# Patient Record
Sex: FEMALE | Race: WHITE | NOT HISPANIC OR LATINO | Employment: FULL TIME | URBAN - METROPOLITAN AREA
[De-identification: names, ages, dates, MRNs, and addresses within clinical notes are randomized per-mention and may not be internally consistent; named-entity substitution may affect disease eponyms.]

---

## 2024-03-18 ENCOUNTER — MEDICAL CORRESPONDENCE (OUTPATIENT)
Dept: HEALTH INFORMATION MANAGEMENT | Facility: CLINIC | Age: 34
End: 2024-03-18
Payer: COMMERCIAL

## 2024-03-20 ENCOUNTER — TRANSCRIBE ORDERS (OUTPATIENT)
Dept: OTHER | Age: 34
End: 2024-03-20

## 2024-03-20 DIAGNOSIS — M79.605 PAIN IN BOTH LOWER EXTREMITIES: ICD-10-CM

## 2024-03-20 DIAGNOSIS — M79.604 PAIN IN BOTH LOWER EXTREMITIES: ICD-10-CM

## 2024-03-20 DIAGNOSIS — E13.620: Primary | ICD-10-CM

## 2024-05-23 ENCOUNTER — LAB (OUTPATIENT)
Dept: LAB | Facility: CLINIC | Age: 34
End: 2024-05-23
Payer: COMMERCIAL

## 2024-05-23 ENCOUNTER — OFFICE VISIT (OUTPATIENT)
Dept: RHEUMATOLOGY | Facility: CLINIC | Age: 34
End: 2024-05-23
Attending: FAMILY MEDICINE
Payer: COMMERCIAL

## 2024-05-23 ENCOUNTER — HOSPITAL ENCOUNTER (OUTPATIENT)
Dept: GENERAL RADIOLOGY | Facility: HOSPITAL | Age: 34
Discharge: HOME | End: 2024-05-23
Attending: PHYSICIAN ASSISTANT
Payer: COMMERCIAL

## 2024-05-23 VITALS
DIASTOLIC BLOOD PRESSURE: 98 MMHG | SYSTOLIC BLOOD PRESSURE: 110 MMHG | WEIGHT: 204 LBS | HEART RATE: 110 BPM | OXYGEN SATURATION: 98 %

## 2024-05-23 DIAGNOSIS — R53.83 FATIGUE, UNSPECIFIED TYPE: ICD-10-CM

## 2024-05-23 DIAGNOSIS — M79.10 MYALGIA: ICD-10-CM

## 2024-05-23 DIAGNOSIS — M25.50 POLYARTHRALGIA: ICD-10-CM

## 2024-05-23 DIAGNOSIS — M25.50 POLYARTHRALGIA: Primary | ICD-10-CM

## 2024-05-23 LAB
CRP SERPL-MCNC: 17.4 MG/L
ERYTHROCYTE [SEDIMENTATION RATE] IN BLOOD BY WESTERGREN METHOD: 13 MM/HR (ref 0–20)

## 2024-05-23 PROCEDURE — 72200 X-RAY EXAM SI JOINTS: CPT

## 2024-05-23 PROCEDURE — 82085 ASSAY OF ALDOLASE: CPT | Mod: 90

## 2024-05-23 PROCEDURE — 86140 C-REACTIVE PROTEIN: CPT

## 2024-05-23 PROCEDURE — 73502 X-RAY EXAM HIP UNI 2-3 VIEWS: CPT

## 2024-05-23 PROCEDURE — 81374 HLA I TYPING 1 ANTIGEN LR: CPT

## 2024-05-23 PROCEDURE — 99204 OFFICE O/P NEW MOD 45 MIN: CPT | Performed by: PHYSICIAN ASSISTANT

## 2024-05-23 PROCEDURE — 99000 SPECIMEN HANDLING OFFICE-LAB: CPT

## 2024-05-23 PROCEDURE — 85652 RBC SED RATE AUTOMATED: CPT

## 2024-05-23 PROCEDURE — 36415 COLL VENOUS BLD VENIPUNCTURE: CPT

## 2024-05-23 RX ORDER — FLUTICASONE PROPIONATE 50 MCG
2 SPRAY, SUSPENSION (ML) NASAL
COMMUNITY

## 2024-05-23 RX ORDER — GLIPIZIDE 10 MG/1
10 TABLET ORAL
COMMUNITY
Start: 2023-09-07

## 2024-05-23 RX ORDER — TRAMADOL HYDROCHLORIDE 50 MG/1
50 TABLET ORAL
COMMUNITY
Start: 2024-03-04

## 2024-05-23 RX ORDER — ACETAMINOPHEN 500 MG
1000 TABLET ORAL
COMMUNITY

## 2024-05-23 RX ORDER — LEVONORGESTREL AND ETHINYL ESTRADIOL 0.15-0.03
1 KIT ORAL AT BEDTIME
COMMUNITY
Start: 2024-03-06

## 2024-05-23 RX ORDER — DAPAGLIFLOZIN 10 MG/1
1 TABLET, FILM COATED ORAL EVERY MORNING
COMMUNITY
Start: 2023-10-05

## 2024-05-23 RX ORDER — IBUPROFEN 200 MG
400 TABLET ORAL
COMMUNITY
Start: 2023-05-22

## 2024-05-23 RX ORDER — GABAPENTIN 300 MG/1
CAPSULE ORAL
COMMUNITY
Start: 2024-04-03

## 2024-05-23 RX ORDER — METHOCARBAMOL 500 MG/1
500-1000 TABLET, FILM COATED ORAL
COMMUNITY
Start: 2023-01-16

## 2024-05-23 NOTE — PATIENT INSTRUCTIONS
After Visit Instructions:     Thank you for coming to [facility] for your care. It is my goal to partner with you to help you reach your optimal state of health.       Plan:     Schedule follow-up with Kailey Jackson PA-C as needed  Imaging: xray SI joint  Labs: CRP, Sed Rate, Aldolase, HLA-B27      Kailey Jackson PA-C  [facility]  [facility]    Contact information: [facility]  Clinic Number:  [phone]  Please call or send a [employer] message with any questions about your care

## 2024-05-23 NOTE — PROGRESS NOTES
Rheumatology Clinic Visit  [facility]  CHARLES Jean Baptiste     Sofia Hzd MRN# [id number]   YOB: 1990 Age: 33 year old   Date of Visit: 05/23/2024  Primary care provider: [facility]          Assessment and Plan:     Polyarthralgia  Myalgia    Patient presents today for an initial evaluation of polyarthralgia and myalgia.  She was having numbness and pain in her arms and underwent a cervical discectomy for cervical spinal stenosis and cervical radiculopathy.  She states that the surgery did help some but she continues to have periodic pain in her arms.  It is not in any specific joints but more in the upper and lower arms and it tends to move around.  She is also been having a lot more pain in her left leg, knee and hip.  Physical examination today did not show any active synovitis or dactylitis.  Bilateral upper and lower extremity strength was 5 out of 5.  She did have tenderness in the left hip/buttock area when testing her lower extremity strength on the left.  Previous laboratory evaluations and imaging studies were reviewed, results below.    Reviewed the results of the patient's previous laboratory evaluation.  Antibody testing has returned normal/negative.  She did have a mildly elevated C-reactive protein.  Discussed with the patient that this is a nonspecific finding by itself.  At this time would recommend further lab evaluation.  Will recheck her inflammatory markers.  Will also get an x-ray of her SI joints as well as her left hip.  We did discuss the potential that this could be a diagnosis of fibromyalgia.  Discussed that this is a diagnosis of exclusion and we will do an autoimmune workup.  I will contact the patient with the results of the evaluation once it is complete.    Plan:     Schedule follow-up with Kailey Jackson PA-C as needed  Imaging: xray SI joint  Labs: CRP, Sed Rate, Aldolase, HLA-B27    CHARLES Jean Baptiste  Rheumatology         History of Present  Illness:   Sofia Hdz presents for evaluation of joint pain.  Past medical history includes insulin-dependent type 2 diabetes mellitus, obesity, pyoderma gangrenosum, history of cervical discectomy, cervical spinal stenosis, cervical radiculopathy, necrobiosis lipoidica diabeticorum.    She states that she has been having bilateral arm pain. It will move around to upper and lower arm. She had neck surgery for the nerves. This was May 2023. This was slightly helpful. This can come after doing activity, such as gardening. Then after she rests the pain starts to come. She states that it does not specifically feel like it is in a joint. She has had issues with her left leg. She states that no answer has been found. It started in the left lower leg and has moved into the knee and hip. She has swelling in her left leg that has been chronic from her previous wound. No other skin rashes. No dry eyes or dry mouth. No hair loss. No mouth sores. She states that she will get a cloudy water eye. It will then cause severe pain. Then it resolves after about 20 min. This started with the last few months. It happens once every couple weeks. She has not seen her eye provider for this. No history of pericarditis. No history of seizures or blood clots. No unexplained fevers or weight loss. No history of ulcerative colitis or crohn's. No personal history of psoriasis, but she did have a 3 weeks course of a pinky patchy scaly spot on her inner elbows. She does have fatigue. She has not noticed that foods, weather or mood affect her symptoms.          Review of Systems:     Constitutional: as above  Skin: as above  Eyes: negative  Ears/Nose/Throat: negative  Respiratory: No shortness of breath, dyspnea on exertion, cough, or hemoptysis  Cardiovascular: negative  Gastrointestinal: negative  Genitourinary: negative  Musculoskeletal: as above  Neurologic: negative  Psychiatric: negative  Hematologic/Lymphatic/Immunologic:  negative  Endocrine: negative         Active Problem List:   There are no problems to display for this patient.           Past Medical History:   No past medical history on file.  Past Surgical History:   Procedure Laterality Date    IR LUMBAR PUNCTURE  2/13/2023            Social History:     Social History     Socioeconomic History    Marital status: Patient Declined     Spouse name: Not on file    Number of children: Not on file    Years of education: Not on file    Highest education level: Not on file   Occupational History    Not on file   Tobacco Use    Smoking status: Not on file    Smokeless tobacco: Not on file   Substance and Sexual Activity    Alcohol use: Not on file    Drug use: Not on file    Sexual activity: Not on file   Other Topics Concern    Not on file   Social History Narrative    Not on file     Social Determinants of Health     Financial Resource Strain: Low Risk  (1/31/2024)    [facility]    Overall Financial Resource Strain (CARDIA)     Difficulty of Paying Living Expenses: Not hard at all   Food Insecurity: No Food Insecurity (1/31/2024)    [facility]    Hunger Vital Sign     Worried About Running Out of Food in the Last Year: Never true     Ran Out of Food in the Last Year: Never true   Transportation Needs: No Transportation Needs (1/31/2024)    Received from [facility]    Transportation Needs     In the past 12 months, has lack of transportation kept you from medical appointments, meetings, work, or from getting medicines or things needed for daily living?: No   Physical Activity: Insufficiently Active (1/31/2024)    [facility]    Exercise Vital Sign     Days of Exercise per Week: 1 day     Minutes of Exercise per Session: 60 min   Stress: No Stress Concern  Present (1/31/2024)    Received from [facility]    [employer] - Occupational Stress Questionnaire     Feeling of Stress : Not at all   Social Connections: Moderately Isolated (1/31/2024)    Received from [facility]    Social Connection and Isolation Panel [NHANES]     Frequency of Communication with Friends and Family: Twice a week     Frequency of Social Gatherings with Friends and Family: Once a week     Attends Alevism Services: Never     Active Member of Clubs or Organizations: No     Attends Club or Organization Meetings: Never     Marital Status:    Interpersonal Safety: Not At Risk (1/31/2024)    Received from [facility]    Intimate Partner Violence     Are you in a relationship where you are physically hurt, threatened and/or made to feel afraid?: No   Housing Stability: Unknown (1/31/2024)    Received from [facility]    Housing Stability Vital Sign     Unable to Pay for Housing in the Last Year: No     Number of Times Moved in the Last Year: Not on file     Homeless in the Last Year: Not on file          Family History:   No family history on file.         Allergies:     Allergies   Allergen Reactions    Metformin Diarrhea    Penicillins Rash     In childhood            Medications:     Current Outpatient Medications   Medication Sig Dispense Refill    acetaminophen (TYLENOL) 500 MG tablet Take 1,000 mg by mouth      dapagliflozin (FARXIGA) 10 MG TABS tablet Take 1 tablet by mouth every morning      fluticasone (FLONASE) 50 MCG/ACT nasal spray Spray 2 sprays in nostril      gabapentin (NEURONTIN) 300 MG capsule TAKE 2 CAPSULES BY MOUTH IN THE MORNING AND 1 CAPSULE IN THE EVENING      glipiZIDE (GLUCOTROL) 10 MG tablet Take 10 mg by mouth      ibuprofen (ADVIL/MOTRIN) 200 MG tablet Take 400 mg by mouth      insulin  degludec (TRESIBA) 200 UNIT/ML pen Inject 90 Units Subcutaneous      levonorgestrel-ethinyl estradiol (NORDETTE) 0.15-30 MG-MCG tablet Take 1 tablet by mouth at bedtime      methocarbamol (ROBAXIN) 500 MG tablet Take 500-1,000 mg by mouth      Semaglutide (RYBELSUS) 3 MG tablet Take 3 mg by mouth      [START ON 6/21/2024] Semaglutide (RYBELSUS) 7 MG tablet Take 7 mg by mouth      traMADol (ULTRAM) 50 MG tablet Take 50 mg by mouth              Physical Exam:   Blood pressure (!) 110/98, pulse 110, weight 92.5 kg (204 lb), SpO2 98%.  Wt Readings from Last 6 Encounters:   05/23/24 92.5 kg (204 lb)     Constitutional: well-developed, appearing stated age; cooperative  Eyes: nl PERRLA, conjunctiva, sclera  ENT: nl external ears, nose, hearing, lips, teeth, gums, throat. No mucositis.   No mucous membrane lesions, normal saliva pool  Neck: no mass or thyroid enlargement  Resp: no shortness of breath with normal conversation  Lymph: no cervical, supraclavicular or epitrochlear nodes  MS: The TMJ, neck, shoulder, elbow, wrist, MCP/PIP/DIP, spine, hip, knee, ankle, and foot MTP/IP joints were examined and found normal. No active synovitis or altered joint anatomy. Full joint ROM. Normal  strength. No dactylitis,  tenosynovitis, enthesopathy. Bilateral upper and lower extremity strength is 5/5. She did have pain in the left hip/buttock area with strength testing   Skin: no nail pitting, alopecia. Dark red [place] to right lower leg, no ulceration.  Psych: nl judgement, orientation, memory, affect.           Data:   Imaging:  CT cervical spine 10/26/2023  IMPRESSION:   1. Stable disc arthroplasty at C6-C7 without complication.   2. Unchanged spondylosis at C4-C5, C5-C6 and C6-C7.   3. Uncomplicated C4 partial laminectomies.     Laboratory:  10/6/2023  JULIANNE negative  CCP antibody negative  No monoclonal protein noted  Tickborne illnesses negative  C-reactive protein 2.11  Sed rate 11  Creatinine 0.81, GFR greater than  60  Albumin 4.2  AST 33, ALT 30    1/31/2024  Hemoglobin A1c 8.2  CK 56

## 2024-05-24 LAB — ALDOLASE SERPL-CCNC: 4.1 U/L

## 2024-05-29 LAB
B LOCUS: NORMAL
B27TEST METHOD: NORMAL

## 2024-06-20 ENCOUNTER — MYC MEDICAL ADVICE (OUTPATIENT)
Dept: RHEUMATOLOGY | Facility: CLINIC | Age: 34
End: 2024-06-20
Payer: COMMERCIAL

## 2024-06-26 NOTE — TELEPHONE ENCOUNTER
Action    Action Taken records request sent to [facility] for CT cervical completed October 2023      SPINE PATIENTS - NEW PROTOCOL PREVISIT    RECORDS RECEIVED FROM: Referred by Kailey Jackson PA-C for 2nd opinion   REASON FOR VISIT: Neck pain    PROVIDER: Anshul   DATE OF APPT: 07/23/2024   NOTES (FOR ALL VISITS) STATUS DETAILS   OFFICE NOTE from referring provider Care Everywhere Referral an notes in chart   OFFICE NOTE from other specialist Care Everywhere PT last completed 02/15/2023 w/[facility]   DISCHARGE SUMMARY from hospital N/A    DISCHARGE REPORT from ER N/A    OPERATIVE REPORT Care Everywhere 2 prior cervical spine surgery [facility] 2014 C6-C7 disc replacement 2014 and 2023   EMG REPORT N/A    MEDICATION LIST N/A    IMAGING  (FOR ALL VISITS)     MRI (HEAD, NECK, SPINE) N/A    XRAY (SPINE) *NEUROSURGERY* N/A    CT (HEAD, NECK, SPINE) received CT cervical 10/26/2023

## 2024-07-20 ENCOUNTER — HEALTH MAINTENANCE LETTER (OUTPATIENT)
Age: 34
End: 2024-07-20

## 2024-07-23 ENCOUNTER — PRE VISIT (OUTPATIENT)
Dept: NEUROSURGERY | Facility: CLINIC | Age: 34
End: 2024-07-23

## 2024-07-23 ENCOUNTER — HOSPITAL ENCOUNTER (OUTPATIENT)
Dept: GENERAL RADIOLOGY | Facility: HOSPITAL | Age: 34
Discharge: HOME | End: 2024-07-23
Attending: PHYSICIAN ASSISTANT | Admitting: PHYSICIAN ASSISTANT
Payer: COMMERCIAL

## 2024-07-23 ENCOUNTER — OFFICE VISIT (OUTPATIENT)
Dept: NEUROSURGERY | Facility: CLINIC | Age: 34
End: 2024-07-23
Attending: PHYSICIAN ASSISTANT
Payer: COMMERCIAL

## 2024-07-23 VITALS — DIASTOLIC BLOOD PRESSURE: 75 MMHG | HEART RATE: 91 BPM | SYSTOLIC BLOOD PRESSURE: 135 MMHG | OXYGEN SATURATION: 99 %

## 2024-07-23 DIAGNOSIS — M54.12 CERVICAL RADICULOPATHY: Primary | ICD-10-CM

## 2024-07-23 DIAGNOSIS — M54.2 NECK PAIN: ICD-10-CM

## 2024-07-23 DIAGNOSIS — M54.12 CERVICAL RADICULOPATHY: ICD-10-CM

## 2024-07-23 PROCEDURE — 72050 X-RAY EXAM NECK SPINE 4/5VWS: CPT

## 2024-07-23 PROCEDURE — 99213 OFFICE O/P EST LOW 20 MIN: CPT | Performed by: PHYSICIAN ASSISTANT

## 2024-07-23 ASSESSMENT — PAIN SCALES - GENERAL: PAINLEVEL: NO PAIN (1)

## 2024-07-23 NOTE — PROGRESS NOTES
NEUROSURGERY CLINIC CONSULT NOTE     DATE OF VISIT: 7/23/2024     SUBJECTIVE:     Sofia Hdz is a pleasant 33 year old female who presents to the clinic today for consultation on second opinion cervical. She is referred to the Neurosurgery Clinic by Kailey Jackson PA-C in Rheumatology. Pertinent medical history consists of prior cervical surgeries [facility] with Dr. Tong- C6-7 arthroplasty, C4 partial laminectomies with last surgery in May 2023.     Patient states symptoms started prior to her first surgery in 2016. She had left sided radicular symptoms that completed resolved after 2016 surgery. She started having new radicular symptoms in BL arms in 2020. She notes pain is intermittent, starts in her neck and radiates in her arms with random distribution in her arms and all of her fingers. Denies one arm being worse than the other and denies certain fingers more affected than others. Denies gait changes, weakness, bowel/bladder changes, changes hand dexterity. She has tried NSAIDs, tylenol, light activity. Prior to 2023 surgery she tried cervical injections and PO steroids. She has not participated in PT since prior to her 2023 surgery. Had EMG in the past, cannot recall when.         Current Outpatient Medications:     acetaminophen (TYLENOL) 500 MG tablet, Take 1,000 mg by mouth, Disp: , Rfl:     dapagliflozin (FARXIGA) 10 MG TABS tablet, Take 1 tablet by mouth every morning, Disp: , Rfl:     fluticasone (FLONASE) 50 MCG/ACT nasal spray, Spray 2 sprays in nostril, Disp: , Rfl:     gabapentin (NEURONTIN) 300 MG capsule, TAKE 2 CAPSULES BY MOUTH IN THE MORNING AND 1 CAPSULE IN THE EVENING, Disp: , Rfl:     glipiZIDE (GLUCOTROL) 10 MG tablet, Take 10 mg by mouth, Disp: , Rfl:     ibuprofen (ADVIL/MOTRIN) 200 MG tablet, Take 400 mg by mouth, Disp: , Rfl:     insulin degludec (TRESIBA) 200 UNIT/ML pen, Inject 90 Units Subcutaneous, Disp: , Rfl:     levonorgestrel-ethinyl estradiol (NORDETTE)  0.15-30 MG-MCG tablet, Take 1 tablet by mouth at bedtime, Disp: , Rfl:     methocarbamol (ROBAXIN) 500 MG tablet, Take 500-1,000 mg by mouth, Disp: , Rfl:     Semaglutide (RYBELSUS) 7 MG tablet, Take 7 mg by mouth, Disp: , Rfl:     traMADol (ULTRAM) 50 MG tablet, Take 50 mg by mouth, Disp: , Rfl:      Allergies   Allergen Reactions    Metformin Diarrhea    Penicillins Rash     In childhood        No past medical history on file.     ROS: 10 point review of symptoms are negative other than the symptoms noted above in the HPI.     Family History has been reviewed with the patient, there are no pertinent findings to presenting concern.     Past Surgical History:   Procedure Laterality Date    IR LUMBAR PUNCTURE  2/13/2023              OBJECTIVE:   /75   Pulse 91   SpO2 99%    There is no height or weight on file to calculate BMI.     Imaging:     EXAM: XR CERVICAL SPINE G/E 4 VIEWS  LOCATION: [facility]  DATE: 7/23/2024     INDICATION: Prior arthroplasty; assess dynamic instability.  COMPARISON: Cervical spine CT 10/23/2023.                                                                      IMPRESSION: Intervertebral disc spacer at the C6-C7 level. Hardware appears intact on these images. Cervical spine alignment is within normal limits without significant change in alignment on flexion or extension views. No obvious loss of vertebral body   height. Moderate degenerative endplate changes and loss of disc height at C4-C5 and to a lesser extent C5-C6.    CT 2023 reviewed.    Full radiological report in chart. Imaging was reviewed with with patient today.     Exam:   CN II-XII grossly intact, alert and appropriate with conversation and following commands.   Gait is non-antalgic. Able to tandem walk. Able to walk on toes and heels without difficulty.   Cervical spine is non tender to palpation. Appropriate range of motion of neck, not concerning for lhermitte's phenomenon. Negative  spurlings BL  Bilateral bicep 2/4 and tricep reflexes 1/4. Sensation intact throughout upper extremities.     UE muscle strength  Right  Left    Deltoid  5/5  5/5    Biceps  5/5  5/5    Triceps  5/5  5/5    Hand intrinsics  5/5  5/5    Hand grasp  5/5  5/5    Zamarripa signs  neg  neg      Intact sensation throughout lower extremities.   Patellar 2+/4 left, 2/4 right and achilles reflex 1/4.     LE muscle strength  Right  Left    Iliopsoas (hip flexion)  5/5  5/5    Quad (knee extension)  5/5  5/5    Hamstring (knee flexion)  5/5  5/5    Gastrocnemius (PF)  5/5  5/5    Tibialis Ant. (DF)  5/5  5/5    EHL  5/5  5/5    Negative for clonus.   Calves are soft and non-tender bilaterally.     ASSESSMENT/PLAN:     Sofia Hdz is a pleasant 33 year old female who presents to the clinic today for consultation on second opinion cervical. She is referred to the Neurosurgery Clinic by Kailey Jackson PA-C in Rheumatology. Pertinent medical history consists of prior cervical surgeries [facility] with Dr. Tong- C6-7 arthroplasty, C4 partial laminectomies with last surgery in May 2023.     Patient states symptoms started prior to her first surgery in 2016. She had left sided radicular symptoms that completed resolved after 2016 surgery. She started having new radicular symptoms in BL arms in 2020. She notes pain is intermittent, starts in her neck and radiates in her arms with random distribution in her arms and all of her fingers. Denies one arm being worse than the other and denies certain fingers more affected than others. Denies gait changes, weakness, bowel/bladder changes, changes hand dexterity. She has tried NSAIDs, tylenol, light activity. Prior to 2023 surgery she tried cervical injections and PO steroids. She has not participated in PT since prior to her 2023 surgery. Had EMG in the past, cannot recall when.     PLAN:   -cervical XR today, will update you on results . Addendum- updated patient on results  7/24    -physical therapy referral to be faxed closer to home   -cervical MRI has been ordered- please ensure you participate in a few sessions of PT prior so there are no insurance issues   -continue conservative measures as you have been doing    Will update you on MRI results after we receive them within 5-7 days. Dr. Camarillo may recommend a EMG in the future     Call [facility] radiology scheduling for your procedure:  For scheduling in the [place] ([place], [facility], and [place]) call [phone] or [phone]      For [facility] ([facility], [facility], [place]), call [phone] or [phone]      For scheduling in the [place] ([facility]) call [phone]  or  [phone]      Waleska Nguyen PA-C  [facility]  [address] [place] [id number]  Tel [phone]  Fax [phone]  Text page via [facility] Paging/Directory

## 2024-07-23 NOTE — Clinical Note
Hi team! Can we fax PT order to [facility]equ[facility], phone # is [phone] Thank you!

## 2024-07-23 NOTE — LETTER
7/23/2024      Sofia Hdz  [address] [facility] [address]      Dear Colleague,    Thank you for referring your patient, Sofia Hdz, to the [facility] SPINE AND NEUROSURGERY. Please see a copy of my visit note below.    NEUROSURGERY CLINIC CONSULT NOTE     DATE OF VISIT: 7/23/2024     SUBJECTIVE:     Sofia Hdz is a pleasant 33 year old female who presents to the clinic today for consultation on second opinion cervical. She is referred to the Neurosurgery Clinic by Kailey Jackson PA-C in Rheumatology. Pertinent medical history consists of prior cervical surgeries [facility] with Dr. Tong- C6-7 arthroplasty, C4 partial laminectomies with last surgery in May 2023.     Patient states symptoms started prior to her first surgery in 2016. She had left sided radicular symptoms that completed resolved after 2016 surgery. She started having new radicular symptoms in BL arms in 2020. She notes pain is intermittent, starts in her neck and radiates in her arms with random distribution in her arms and all of her fingers. Denies one arm being worse than the other and denies certain fingers more affected than others. Denies gait changes, weakness, bowel/bladder changes, changes hand dexterity. She has tried NSAIDs, tylenol, light activity. Prior to 2023 surgery she tried cervical injections and PO steroids. She has not participated in PT since prior to her 2023 surgery. Had EMG in the past, cannot recall when.         Current Outpatient Medications:      acetaminophen (TYLENOL) 500 MG tablet, Take 1,000 mg by mouth, Disp: , Rfl:      dapagliflozin (FARXIGA) 10 MG TABS tablet, Take 1 tablet by mouth every morning, Disp: , Rfl:      fluticasone (FLONASE) 50 MCG/ACT nasal spray, Spray 2 sprays in nostril, Disp: , Rfl:      gabapentin (NEURONTIN) 300 MG capsule, TAKE 2 CAPSULES BY MOUTH IN THE MORNING AND 1 CAPSULE IN THE EVENING, Disp: , Rfl:      glipiZIDE (GLUCOTROL) 10 MG tablet,  Take 10 mg by mouth, Disp: , Rfl:      ibuprofen (ADVIL/MOTRIN) 200 MG tablet, Take 400 mg by mouth, Disp: , Rfl:      insulin degludec (TRESIBA) 200 UNIT/ML pen, Inject 90 Units Subcutaneous, Disp: , Rfl:      levonorgestrel-ethinyl estradiol (NORDETTE) 0.15-30 MG-MCG tablet, Take 1 tablet by mouth at bedtime, Disp: , Rfl:      methocarbamol (ROBAXIN) 500 MG tablet, Take 500-1,000 mg by mouth, Disp: , Rfl:      Semaglutide (RYBELSUS) 7 MG tablet, Take 7 mg by mouth, Disp: , Rfl:      traMADol (ULTRAM) 50 MG tablet, Take 50 mg by mouth, Disp: , Rfl:      Allergies   Allergen Reactions     Metformin Diarrhea     Penicillins Rash     In childhood        No past medical history on file.     ROS: 10 point review of symptoms are negative other than the symptoms noted above in the HPI.     Family History has been reviewed with the patient, there are no pertinent findings to presenting concern.     Past Surgical History:   Procedure Laterality Date     IR LUMBAR PUNCTURE  2/13/2023              OBJECTIVE:   /75   Pulse 91   SpO2 99%    There is no height or weight on file to calculate BMI.     Imaging:     CT 2023 reviewed.    Full radiological report in chart. Imaging was reviewed with with patient today.     Exam:   CN II-XII grossly intact, alert and appropriate with conversation and following commands.   Gait is non-antalgic. Able to tandem walk. Able to walk on toes and heels without difficulty.   Cervical spine is non tender to palpation. Appropriate range of motion of neck, not concerning for lhermitte's phenomenon. Negative spurlings BL  Bilateral bicep 2/4 and tricep reflexes 1/4. Sensation intact throughout upper extremities.     UE muscle strength  Right  Left    Deltoid  5/5  5/5    Biceps  5/5  5/5    Triceps  5/5  5/5    Hand intrinsics  5/5  5/5    Hand grasp  5/5  5/5    Zamarripa signs  neg  neg      Intact sensation throughout lower extremities.   Patellar 2+/4 left, 2/4 right and achilles reflex  1/4.     LE muscle strength  Right  Left    Iliopsoas (hip flexion)  5/5  5/5    Quad (knee extension)  5/5  5/5    Hamstring (knee flexion)  5/5  5/5    Gastrocnemius (PF)  5/5  5/5    Tibialis Ant. (DF)  5/5  5/5    EHL  5/5  5/5    Negative for clonus.   Calves are soft and non-tender bilaterally.     ASSESSMENT/PLAN:     Sofia Hdz is a pleasant 33 year old female who presents to the clinic today for consultation on second opinion cervical. She is referred to the Neurosurgery Clinic by Kailey Jackson PA-C in Rheumatology. Pertinent medical history consists of prior cervical surgeries [facility] with Dr. Tong- C6-7 arthroplasty, C4 partial laminectomies with last surgery in May 2023.     Patient states symptoms started prior to her first surgery in 2016. She had left sided radicular symptoms that completed resolved after 2016 surgery. She started having new radicular symptoms in BL arms in 2020. She notes pain is intermittent, starts in her neck and radiates in her arms with random distribution in her arms and all of her fingers. Denies one arm being worse than the other and denies certain fingers more affected than others. Denies gait changes, weakness, bowel/bladder changes, changes hand dexterity. She has tried NSAIDs, tylenol, light activity. Prior to 2023 surgery she tried cervical injections and PO steroids. She has not participated in PT since prior to her 2023 surgery. Had EMG in the past, cannot recall when.     PLAN:   -cervical XR today, will update you on results   -physical therapy referral to be faxed closer to home   -cervical MRI has been ordered- please ensure you participate in a few sessions of PT prior so there are no insurance issues   -continue conservative measures as you have been doing    Will update you on MRI results after we receive them within 5-7 days. Dr. Camarillo may recommend a EMG in the future     Call [place] radiology scheduling for your procedure:  For  scheduling in the [place] ([place], [facility], and [place]) call [phone] or [phone]      For scheduling at [facility] ([facility], [facility], [place]), call [phone] or [phone]      For scheduling in the [place] ([facility]) call [phone]  or  [phone]      Waleska Nguyen PA-C  [facility] Neurosurgery  [address] [place] [address]  Tel [phone]  Fax [phone]  Text page via [facility] Paging/Directory    Again, thank you for allowing me to participate in the care of your patient.        Sincerely,        Waleska Landers PA-C

## 2024-07-23 NOTE — PATIENT INSTRUCTIONS
-cervical XR today, will update you on results   -physical therapy referral to be faxed closer to home   -cervical MRI has been ordered- please ensure you participate in a few sessions of PT prior so there are no insurance issues   -continue conservative measures as you have been doing    Will update you on MRI results after we receive them within 5-7 days. Dr. Camarillo may recommend a EMG in the future     Call [place] radiology scheduling for your procedure:  For scheduling in the [place] ([place], [facility], and [place]) call [phone] or [phone]      For scheduling at [facility] ([facility], [facility], [place]), call [phone] or [phone]      For scheduling in the [place] ([facility]) call [phone]  or  [phone]      Waleska Nguyen PA-C  [facility] Neurosurgery  [address]  [address] [place] [id number]  Tel [phone]  Fax [phone]  Text page via [facility] Paging/Directory

## 2024-07-31 ENCOUNTER — MYC MEDICAL ADVICE (OUTPATIENT)
Dept: RHEUMATOLOGY | Facility: CLINIC | Age: 34
End: 2024-07-31
Payer: COMMERCIAL

## 2024-09-23 ENCOUNTER — HOSPITAL ENCOUNTER (OUTPATIENT)
Dept: MRI IMAGING | Facility: CLINIC | Age: 34
Discharge: HOME | End: 2024-09-23
Attending: PHYSICIAN ASSISTANT | Admitting: PHYSICIAN ASSISTANT
Payer: COMMERCIAL

## 2024-09-23 DIAGNOSIS — M54.12 CERVICAL RADICULOPATHY: ICD-10-CM

## 2024-09-23 PROCEDURE — 72156 MRI NECK SPINE W/O & W/DYE: CPT

## 2024-09-23 PROCEDURE — 255N000002 HC RX 255 OP 636: Performed by: PHYSICIAN ASSISTANT

## 2024-09-23 PROCEDURE — A9585 GADOBUTROL INJECTION: HCPCS | Performed by: PHYSICIAN ASSISTANT

## 2024-09-23 RX ORDER — GADOBUTROL 604.72 MG/ML
10 INJECTION INTRAVENOUS ONCE
Status: COMPLETED | OUTPATIENT
Start: 2024-09-23 | End: 2024-09-23

## 2024-09-23 RX ADMIN — GADOBUTROL 9 ML: 604.72 INJECTION INTRAVENOUS at 12:22

## 2024-09-24 ENCOUNTER — TELEPHONE (OUTPATIENT)
Dept: NEUROSURGERY | Facility: CLINIC | Age: 34
End: 2024-09-24
Payer: COMMERCIAL

## 2024-09-24 DIAGNOSIS — M54.2 NECK PAIN: Primary | ICD-10-CM

## 2024-09-24 DIAGNOSIS — G93.9 BRAIN LESION: ICD-10-CM

## 2024-09-24 DIAGNOSIS — M54.12 CERVICAL RADICULOPATHY: ICD-10-CM

## 2024-09-24 NOTE — TELEPHONE ENCOUNTER
Reviewed MRI with patient      Recommend   EMG BUE  Brain MRI pituitary protocol     Will set up with Dr. Ybarra in clinic following pituitary MRI     Patient in agreement     Waleska Nguyen PA-C  [facility] Neurosurgery  [address] [address]  Tel [phone]  Fax [phone]  Text page via [facility] Paging/Directory

## 2024-09-25 ENCOUNTER — HOSPITAL ENCOUNTER (OUTPATIENT)
Dept: MRI IMAGING | Facility: CLINIC | Age: 34
Discharge: HOME | End: 2024-09-25
Attending: PHYSICIAN ASSISTANT | Admitting: PHYSICIAN ASSISTANT
Payer: COMMERCIAL

## 2024-09-25 ENCOUNTER — TELEPHONE (OUTPATIENT)
Dept: NEUROSURGERY | Facility: CLINIC | Age: 34
End: 2024-09-25
Payer: COMMERCIAL

## 2024-09-25 DIAGNOSIS — G93.9 BRAIN LESION: ICD-10-CM

## 2024-09-25 DIAGNOSIS — G93.9 BRAIN LESION: Primary | ICD-10-CM

## 2024-09-25 DIAGNOSIS — M54.12 CERVICAL RADICULOPATHY: Primary | ICD-10-CM

## 2024-09-25 PROCEDURE — 255N000002 HC RX 255 OP 636: Performed by: PHYSICIAN ASSISTANT

## 2024-09-25 PROCEDURE — A9585 GADOBUTROL INJECTION: HCPCS | Performed by: PHYSICIAN ASSISTANT

## 2024-09-25 PROCEDURE — 70553 MRI BRAIN STEM W/O & W/DYE: CPT

## 2024-09-25 RX ORDER — GADOBUTROL 604.72 MG/ML
9 INJECTION INTRAVENOUS ONCE
Status: COMPLETED | OUTPATIENT
Start: 2024-09-25 | End: 2024-09-25

## 2024-09-25 RX ADMIN — GADOBUTROL 9 ML: 604.72 INJECTION INTRAVENOUS at 20:35

## 2024-09-25 NOTE — TELEPHONE ENCOUNTER
Who's calling:   Patient             Family/Other name:      On C2C: yes or No: N/A       Providers name/other:    DOMINGO  Reason for call:  PATIENT INFORMATION & QUESTIONS    Detailed Message: Patient scheduled for an MRI ordered by Waleska Landers. She would like this to be done as soon as possible, was wondering if provider can change this order to be a STAT order to be seen sooner.     Please advise, thank you.      Call back #: [phone]  Preferred Pharmacy:

## 2024-09-26 ENCOUNTER — TELEPHONE (OUTPATIENT)
Dept: NEUROSURGERY | Facility: CLINIC | Age: 34
End: 2024-09-26
Payer: COMMERCIAL

## 2024-09-26 ENCOUNTER — LAB (OUTPATIENT)
Dept: LAB | Facility: CLINIC | Age: 34
End: 2024-09-26
Payer: COMMERCIAL

## 2024-09-26 ENCOUNTER — MYC MEDICAL ADVICE (OUTPATIENT)
Dept: NEUROSURGERY | Facility: CLINIC | Age: 34
End: 2024-09-26
Payer: COMMERCIAL

## 2024-09-26 ENCOUNTER — DOCUMENTATION ONLY (OUTPATIENT)
Dept: NEUROSURGERY | Facility: CLINIC | Age: 34
End: 2024-09-26
Payer: COMMERCIAL

## 2024-09-26 DIAGNOSIS — G93.9 BRAIN LESION: ICD-10-CM

## 2024-09-26 LAB
T4 FREE SERPL-MCNC: 1.28 NG/DL (ref 0.9–1.7)
TSH SERPL DL<=0.005 MIU/L-ACNC: 3.43 UIU/ML (ref 0.3–4.2)

## 2024-09-26 PROCEDURE — 84481 FREE ASSAY (FT-3): CPT

## 2024-09-26 PROCEDURE — 82533 TOTAL CORTISOL: CPT

## 2024-09-26 PROCEDURE — 83001 ASSAY OF GONADOTROPIN (FSH): CPT

## 2024-09-26 PROCEDURE — 83002 ASSAY OF GONADOTROPIN (LH): CPT

## 2024-09-26 PROCEDURE — 84146 ASSAY OF PROLACTIN: CPT

## 2024-09-26 PROCEDURE — 84439 ASSAY OF FREE THYROXINE: CPT

## 2024-09-26 PROCEDURE — 84443 ASSAY THYROID STIM HORMONE: CPT

## 2024-09-26 PROCEDURE — 82024 ASSAY OF ACTH: CPT

## 2024-09-26 PROCEDURE — 84305 ASSAY OF SOMATOMEDIN: CPT

## 2024-09-26 PROCEDURE — 84144 ASSAY OF PROGESTERONE: CPT

## 2024-09-26 PROCEDURE — 36415 COLL VENOUS BLD VENIPUNCTURE: CPT

## 2024-09-26 NOTE — TELEPHONE ENCOUNTER
Faxed  Lab requisitions with diagnosis and ordering Provider's signature  September 26, 2024 to fax number [phone]    Right Fax confirmed at 144 PM    Quinn Sam RN

## 2024-09-26 NOTE — TELEPHONE ENCOUNTER
[facility] Call Center    Phone Message    May a detailed message be left on voicemail: yes     Reason for Call: Other: Patient is returning call to schedule. Writer is unable to assist in scheduling without scheduling instructions. Please review and call back.      Action Taken: Message routed to:  Other: [facility] Neurosurgery    Travel Screening: Not Applicable

## 2024-09-26 NOTE — TELEPHONE ENCOUNTER
[facility] Call Center    Phone Message    May a detailed message be left on voicemail: yes     Reason for Call: Luma from [facility] called.  She received lab orders from Waleska Landers but the orders do not include an electronic signature from the provider and they do no have a diagnosis on any of the pages.  It needs to include a diagnosis.  She is requesting that this be updated and resent to them.  Please resend.  Call if you have any questions.  Thanks.

## 2024-09-26 NOTE — TELEPHONE ENCOUNTER
Per staff message request, patient needs appointment with Dr. Ybarra for a surgical consult. Writer WILLIAMS for patient to call back at [phone] to schedule when she is able.

## 2024-09-26 NOTE — TELEPHONE ENCOUNTER
Faxed  lab orders  September 26, 2024 to fax number [phone]    Right Fax confirmed at 1036 AM    Quinn Sam RN

## 2024-09-27 LAB
ACTH PLAS-MCNC: 18 PG/ML
CORTIS SERPL-MCNC: 10.2 UG/DL
FSH SERPL IRP2-ACNC: 0.7 MIU/ML
IGF-I BLD-MCNC: 200 NG/ML (ref 83–280)
LH SERPL-ACNC: <0.3 MIU/ML
PROGEST SERPL-MCNC: 0.1 NG/ML
PROLACTIN SERPL 3RD IS-MCNC: 163 NG/ML (ref 5–23)
T3FREE SERPL-MCNC: 3.6 PG/ML (ref 2–4.4)

## 2024-09-30 ENCOUNTER — TELEPHONE (OUTPATIENT)
Dept: OPHTHALMOLOGY | Facility: CLINIC | Age: 34
End: 2024-09-30

## 2024-09-30 ENCOUNTER — OFFICE VISIT (OUTPATIENT)
Dept: NEUROSURGERY | Facility: CLINIC | Age: 34
End: 2024-09-30
Payer: COMMERCIAL

## 2024-09-30 VITALS
DIASTOLIC BLOOD PRESSURE: 76 MMHG | SYSTOLIC BLOOD PRESSURE: 114 MMHG | WEIGHT: 210.7 LBS | OXYGEN SATURATION: 98 % | HEART RATE: 78 BPM

## 2024-09-30 DIAGNOSIS — G93.9 BRAIN LESION: Primary | ICD-10-CM

## 2024-09-30 PROCEDURE — 99245 OFF/OP CONSLTJ NEW/EST HI 55: CPT | Performed by: NEUROLOGICAL SURGERY

## 2024-09-30 ASSESSMENT — PAIN SCALES - GENERAL: PAINLEVEL: MILD PAIN (2)

## 2024-09-30 NOTE — TELEPHONE ENCOUNTER
Called and LVM     Sent a [email or web address] message     Ok to schedule with Dr. Garcia or Dr. Hernandez in a new neuro - add to the wait list     Mechelle Jacob Communication Facilitator on 9/30/2024 at 5:22 PM

## 2024-09-30 NOTE — LETTER
9/30/2024      Sofia Hdz  [address] [facility] [address]      Dear Colleague,    Thank you for referring your patient, Sofia Hdz, to the [facility] NEUROLOGY CLINICS [facility]. Please see a copy of my visit note below.    I was asked by Dr. Murphy to see this patient in consultation    33 year old female with sellar mass.  Was discovered on MRI Cervical for evaluation of neck pain.  Patient describes a couple months of other symptoms, including right eyelid twitching, some non-specific right eye vision change, and dull burning pain in the right forehead and maxilla, with throbbing frontal and occipital headaches as well.  She notes that she used to have very irregular menstrual cycles prior to starting birth control a few years ago.  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       No past medical history on file.  Past Surgical History:   Procedure Laterality Date     IR LUMBAR PUNCTURE  2/13/2023         Physical Exam  /76   Pulse 78   Wt 95.6 kg (210 lb 11.2 oz)   SpO2 98%     Mental status:  Alert and Oriented x 3, speech is fluent.  HEENT:  PERRL.  EOM s intact.  Visual fields full to gross exam  Cranial nerves:  II-XII intact.   Motor:    Shoulder Abduction:  Right:  5/5   Left:  5/5  Biceps:                      Right:  5/5   Left:  5/5  Triceps:                     Right:  5/5   Left:  5/5  Interosseus :             Right:  5/5   Left:  5/5  Hip Flexion:               Right: 5/5  Left:  5/5  Quadriceps:              Right:  5/5  Left:  5/5  Hamstrings:              Right:  5/5  Left:  5/5  Gastroc Soleus:        Right:  5/5  Left:  5/5  Tib/Ant:                      Right:  5/5  Left:  5/5  EHL:                          Right:  5/5  Left:  5/5  Sensation:  Intact  Reflexes:  Negative  Irina.  Smooth tandem walking.      A/P:  33 year old female with sellar mass    I had a discussion with the patient, reviewing the history, symptoms, and imaging  Will order Neuro-Optho eval  Endocrine eval with Dr. Cortez to review possibility of lymphocytic hypophysitis and whether their are medical treatment options  Follow up after work-up complete          Again, thank you for allowing me to participate in the care of your patient.        Sincerely,        Oscar Ybarra MD

## 2024-09-30 NOTE — PROGRESS NOTES
I was asked by Dr. Murphy to see this patient in consultation    33 year old female with sellar mass.  Was discovered on MRI Cervical for evaluation of neck pain.  Patient describes a couple months of other symptoms, including right eyelid twitching, some non-specific right eye vision change, and dull burning pain in the right forehead and maxilla, with throbbing frontal and occipital headaches as well.  She notes that she used to have very irregular menstrual cycles prior to starting birth control a few years ago.  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       No past medical history on file.  Past Surgical History:   Procedure Laterality Date    IR LUMBAR PUNCTURE  2/13/2023         Physical Exam  /76   Pulse 78   Wt 95.6 kg (210 lb 11.2 oz)   SpO2 98%     Mental status:  Alert and Oriented x 3, speech is fluent.  HEENT:  PERRL.  EOM s intact.  Visual fields full to gross exam  Cranial nerves:  II-XII intact.   Motor:    Shoulder Abduction:  Right:  5/5   Left:  5/5  Biceps:                      Right:  5/5   Left:  5/5  Triceps:                     Right:  5/5   Left:  5/5  Interosseus :             Right:  5/5   Left:  5/5  Hip Flexion:               Right: 5/5  Left:  5/5  Quadriceps:              Right:  5/5  Left:  5/5  Hamstrings:              Right:  5/5  Left:  5/5  Gastroc Soleus:        Right:  5/5  Left:  5/5  Tib/Ant:                      Right:  5/5  Left:  5/5  EHL:                          Right:  5/5  Left:  5/5  Sensation:  Intact  Reflexes:  Negative Zamarripa's.  Smooth tandem walking.      A/P:  33 year old female with sellar mass    I had a discussion with the patient, reviewing the history, symptoms, and imaging  Will order Neuro-Optho eval  Endocrine eval with Dr. Cortez to review possibility of lymphocytic  hypophysitis and whether their are medical treatment options  Follow up after work-up complete

## 2024-09-30 NOTE — NURSING NOTE
Sofia Hdz is a 33 year old female who presents for:  Chief Complaint   Patient presents with    RECHECK     Pain in the face - discomfort.        Initial Vitals:  /76   Pulse 78   Wt 210 lb 11.2 oz (95.6 kg)   SpO2 98%  There is no height or weight on file to calculate BMI.. There is no height or weight on file to calculate BSA. BP completed using cuff size: regular  Mild Pain (2)    Nursing Comments:       Jessica Hoffman

## 2024-09-30 NOTE — PATIENT INSTRUCTIONS
Patient Next Steps:      Order placed for Adult Eye Referral (Neuro-Ophthalmology).  You can call the phone number highlighted in the order to schedule your appointment.   [facility] will call you to coordinate your care as prescribed by your provider. If you don't hear from a representative within 2 business days, please call [phone].      A referral has been placed for you to see Dr. Cortez with Endocrinology. [facility] will call you to coordinate your care as prescribed by the provider. If you don t hear from a representative within 2 business days, please call [phone].     Please call us if you have any further questions or concerns.    [facility] Neurosurgery Clinic   Phone: [phone]  Fax: [phone]

## 2024-09-30 NOTE — TELEPHONE ENCOUNTER
[facility] Call Center    Phone Message    May a detailed message be left on voicemail: yes     Reason for Call: Appointment Intake    Referring Provider Name: Oscar Ybarra MD   Diagnosis and/or Symptoms: Brain lesion [G93.9] Neuro-Ophthalmology referral    DX not in protocol, writer unable to schedule     Action Taken: Message routed to:  [facility] eye    Travel Screening: Not Applicable     Date of Service:

## 2024-10-03 ENCOUNTER — TELEPHONE (OUTPATIENT)
Dept: ENDOCRINOLOGY | Facility: CLINIC | Age: 34
End: 2024-10-03
Payer: COMMERCIAL

## 2024-10-03 NOTE — TELEPHONE ENCOUNTER
Left Voicemail (1st Attempt) for the patient to call back and schedule the following:    Appointment type: New Pituitary   Provider: Any that see pituitary   Return date: within 1 week  Specialty phone number: [phone]  Additional appointment(s) needed:   Additonal Notes: WILLIAMS, MyC x1  Marita Muñoz MD Araki, Takako, MD; [id number]; Tarah Castellanos, ELLA; Marita Muñoz MD  To schedulers : please schedule , in this order of preference, 1) open new/ANA, preferred Araki 2) on call consult service ANA within the week. This could be a virtual visit.      Examples:  10/4 [place] virtual mg   10/25 [place] virtual csc (schedule as new endocrine)   10/29 [place] virtual csc (schedule as new endocrine)  11/1 [place] virtual csc (schedule new endocrine)   11/6 Araki virtual csc  11/20 Araki virtual csc  11/27 [place] in person csc     Please note that the above appointment(s) will require manual scheduling as they are marked as ANA and will not appear using auto search. Do not schedule the patient if another patient has already been scheduled in the requested appointment slot.     Bisi Young on 10/3/2024 at 8:53 AM

## 2024-10-04 ENCOUNTER — VIRTUAL VISIT (OUTPATIENT)
Dept: ENDOCRINOLOGY | Facility: CLINIC | Age: 34
End: 2024-10-04
Payer: COMMERCIAL

## 2024-10-04 DIAGNOSIS — E22.1 HYPERPROLACTINEMIA (H): ICD-10-CM

## 2024-10-04 DIAGNOSIS — D35.2 PITUITARY ADENOMA WITH EXTRASELLAR EXTENSION (H): Primary | ICD-10-CM

## 2024-10-04 PROCEDURE — 99245 OFF/OP CONSLTJ NEW/EST HI 55: CPT | Mod: 95 | Performed by: INTERNAL MEDICINE

## 2024-10-04 RX ORDER — CABERGOLINE 0.5 MG/1
0.5 TABLET ORAL
Qty: 24 TABLET | Refills: 3 | Status: SHIPPED | OUTPATIENT
Start: 2024-10-07

## 2024-10-04 NOTE — NURSING NOTE
Current patient location:  [place]    Is the patient currently in the state of [place]? YES    Visit mode:VIDEO    If the visit is dropped, the patient can be reconnected by: VIDEO VISIT: Text to cell phone:   Telephone Information:   Mobile [phone]       Will anyone else be joining the visit? NO  (If patient encounters technical issues they should call [phone])    Are changes needed to the allergy or medication list? No    Are refills needed on medications prescribed by this physician? NO- new pt     Rooming Documentation:  Not applicable    Reason for visit: Consult (Brain lesion)    Keiko CRUMP

## 2024-10-04 NOTE — LETTER
10/4/2024      Sofia Hdz  [id number] [facility] [place] [address]      Dear Colleague,    Thank you for referring your patient, Sofia Hdz, to the [facility]. Please see a copy of my visit note below.    Video visit  Start 11:12 am  End 11:55 am  [facility]                                                                               - Endocrinology Initial Consultation -    Reason for visit/consult:  Brain lesion     Primary care provider: Twin Murphy    HPI: A 33 year old female is here for endocrinology clinic visit for endocrine aspect evaluation of her sellar mass. Referral from Dr. Ybarra. Her sellar lesion was discovered incidentally when she went to neck MRI. She has history of neck surgery twice in the past. First neck surgery was in 2014 due to neck herniation C6-7, then she underwent the second srugery 2022, 2 years ago neck C4-5 surgery in [place], she was not relieved so much from her neck pain and she started to come to [facility] , she underwent  physical therapy program. In Setemper 2024, neck MRI was repeated again, and incidentally found sellar mass, soon after that, she was referred by Dr. Ybarra.     ASKEW: occasional, right side fore head, she has HA everyday, especially for the past 6 month.   Dizziness with changing position bending head, she experiences 2-3 times a week. No Nausea, no vomitting.   Appetite: normal,  lb, Ht: 5'1'. Sleep 7 hours, Energy level low: past 6 month, tired all the time.     No breast tenderness, no galactorrhea.  Menstrual cycle: taking BCPs for 10 years. She was started because of irregular menses.     DM2 since 2014, takes  glipizide, dapaglifozin, and tresiba 90 units daily. Am morning 140-200 range.   A1C 8.0 in 9/2024.       , no biological children, no history of pregnancy. 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     Past Medical/Surgical History:  No past medical history on file.  Past Surgical History:    Procedure Laterality Date     IR LUMBAR PUNCTURE  2/13/2023       Allergies:  Allergies   Allergen Reactions     Metformin Diarrhea     Penicillins Rash     In childhood       Current Medications   Current Outpatient Medications   Medication Sig Dispense Refill     acetaminophen (TYLENOL) 500 MG tablet Take 1,000 mg by mouth       dapagliflozin (FARXIGA) 10 MG TABS tablet Take 1 tablet by mouth every morning       gabapentin (NEURONTIN) 300 MG capsule TAKE 2 CAPSULES BY MOUTH IN THE MORNING AND 1 CAPSULE IN THE EVENING       glipiZIDE (GLUCOTROL) 10 MG tablet Take 10 mg by mouth       ibuprofen (ADVIL/MOTRIN) 200 MG tablet Take 400 mg by mouth       insulin degludec (TRESIBA) 200 UNIT/ML pen Inject 90 Units Subcutaneous       levonorgestrel-ethinyl estradiol (NORDETTE) 0.15-30 MG-MCG tablet Take 1 tablet by mouth at bedtime       Semaglutide (RYBELSUS) 7 MG tablet Take 7 mg by mouth       traMADol (ULTRAM) 50 MG tablet Take 50 mg by mouth       fluticasone (FLONASE) 50 MCG/ACT nasal spray Spray 2 sprays in nostril       methocarbamol (ROBAXIN) 500 MG tablet Take 500-1,000 mg by mouth       No current facility-administered medications for this visit.       Family History:  No family history on file.    Social History:  Social History     Tobacco Use     Smoking status: Never     Smokeless tobacco: Never   Substance Use Topics     Alcohol use: Not on file       ROS:  Full review of systems taken with the help of the intake sheet. Otherwise a complete 14 point review of systems was taken and is negative unless stated in the history above.    Physical Exam:   Vitals: There were no vitals taken for this visit.  BMI= There is no height or weight on file to calculate BMI.   General: well appearing, no acute distress, pleasant and conversant,   Mental Status/neuro: alert and oriented  Face: symmetrical, normal facial color  Eyes: anicteric, no proptosis or lid lag  Resp: normally breathing      Labs : I reviewed data from  "epic and extract and summarize the pertinent data here.      Latest Reference Range & Units 09/26/24 17:33   Adrenal Corticotropin <47 pg/mL 18   Cortisol Serum ug/dL 10.2   Free T3 2.0 - 4.4 pg/mL 3.6   FSH mIU/mL 0.7   Luteinizing Hormone mIU/mL <0.3   Progesterone ng/mL 0.1   Prolactin 5 - 23 ng/mL 163 (H)   T4 Free 0.90 - 1.70 ng/dL 1.28   TSH 0.30 - 4.20 uIU/mL 3.43   Ins Growth Factor 1 83 - 280 ng/mL 200   (H): Data is abnormally high  No results found for: \"NA\"   No results found for: \"POTASSIUM\"  No results found for: \"CHLORIDE\"  No results found for: \"NILES\"  No results found for: \"CO2\"  No results found for: \"BUN\"  No results found for: \"CR\"  No results found for: \"GLC\"  Lab Results   Component Value Date    TSH 3.43 09/26/2024     Lab Results   Component Value Date    T4 1.28 09/26/2024     No results found for: \"A1C\"    No results found for: \"IGF1\"  No results found for: \"LH\"  Lab Results   Component Value Date    FSH 0.7 09/26/2024     No results found for: \"ESTROGEN\"  No results found for: \"PROLACTIN\"        MRI Brain: I personally reviewed the original images and agree with the below reports.   Results for orders placed during the hospital encounter of 09/25/24    MR Brain w/o & w Contrast    Narrative  EXAM: MR BRAIN W/O and W CONTRAST  LOCATION: [facility]  DATE: 9/25/2024    INDICATION: Brain lesion  COMPARISON: Cervical spine MRI dated 9/23/2024  CONTRAST: 9 mL Gadavist  TECHNIQUE: Multiplanar multisequence head MRI without and with intravenous contrast including dedicated imaging of the sella.    FINDINGS:  SELLA: Dedicated non-dynamic imaging of the sella was performed. A lobulated right-sided mass expanding the sella which demonstrates T1-isointense, slightly T2-hyperintense signal intensity relative to gray matter and diffuse slightly heterogeneous  enhancement measuring approximately 2.4 x 1.8 x 2.1 cm (TRV x AP x CC ). Suprasellar extension of the mass with " leftward displacement and questionable involvement of the pituitary infundibulum (series 10 image 7). Additionally, superior displacement of  the optic chiasm and prechiasmatic optic nerve on the right without definite invasion. Invasion of the cavernous sinus on the right with near-complete encasement of the cavernous right ICA. Maintained right ICA flow void. No discrete dural tail. Partial  effacement of the right Meckel's cave by the mass without discrete invasion. Notably, a residual normal adenohypophysis is seen on the left (series 10 image 7). No posterior pituitary bright spot, which may be obscured by the mass. No evidence of acute  intralesional hemorrhage. No extension into the prepontine cistern.    INTRACRANIAL CONTENTS: No acute/subacute infarct, acute hemorrhage, or extra-axial fluid collection. No intra-axial mass or abnormal enhancement. Normal brain parenchymal signal. Normal ventricles and sulci for age. Normal position of the cerebellar  tonsils.    OTHER: Again noted partially imaged asymmetrically prominent subcentimeter ovoid right parotid space soft tissue nodule, likely asymmetrically enlarged lymph nodes, nonspecific. A subcentimeter rim-enhancing lesion within the right occipital scalp,  nonspecific although probably a sebaceous cyst; correlate with direct inspection.    Impression  IMPRESSION:  1.  A slightly lobulated enhancing right-sided mass expanding the sella with suprasellar extension resulting in leftward displacement and questionable involvement of the pituitary infundibulum. Residual adenohypophysis tissue noted on the left.  Right-sided mass effect upon and superior displacement of the optic chiasm and prechiasmatic optic nerve as well as cavernous sinus invasion and near-complete cavernous ICA encasement. Differential considerations include a pituitary macroadenoma or  adenohypophysitis (e.g., lymphocytic, granulomatous, or IgG for-related hypophysitis). Correlate with  history and laboratory assessment. Meningioma and craniopharyngioma are felt less likely.  2.  No superimposed acute intracranial abnormality.          Assessment and Plan  33 year old female with sellar mass with left eye blurry vision,     Sellar mass  Radiology report it was read adenoma vs hypophysitis. I did personal review of the images, I think it is more favor to adenoma. Also heterogenous part, and PRL level, could be prolatinoma with low potent producing PRL, I discussed with Dr. Ybarra and we will try first cabergoline and reassess to consider surgical option      - cabergline 0.5 mg twice a week    - repeat MRI in 3 months, if no improvement or more urgent impression by neuroophthalmologist then  immediately back to Dr. Ybarra for consideration for the surgery    RTC with me in 4 month       65 minutes spent on the date of the encounter doing chart review, history and exam, personal review of imaging, outside record, documentation and further activities as noted above.    Note: Chart documentation done in part with Dragon Voice Recognition software. Although reviewed after completion, some word and grammatical errors may remain.  Please consider this when interpreting information in this chart     Adilia Cortez MD  Staff Physician  Endocrinology and Metabolism  License: [id number]       Again, thank you for allowing me to participate in the care of your patient.        Sincerely,        Adilia Cortez MD

## 2024-10-04 NOTE — PROGRESS NOTES
Video visit  Start 11:12 am  End 11:55 am  [facility]                                                                               - Endocrinology Initial Consultation -    Reason for visit/consult:  Brain lesion     Primary care provider: Twin Murphy    HPI: A 33 year old female is here for endocrinology clinic visit for endocrine aspect evaluation of her sellar mass. Referral from Dr. Ybarra. Her sellar lesion was discovered incidentally when she went to neck MRI. She has history of neck surgery twice in the past. First neck surgery was in 2014 due to neck herniation C6-7, then she underwent the second srugery 2022, 2 years ago neck C4-5 surgery in [place], she was not relieved so much from her neck pain and she started to come to [facility] , she underwent  physical therapy program. In Set[place] 2024, neck MRI was repeated again, and incidentally found sellar mass, soon after that, she was referred by Dr. Ybarra.     ASKEW: occasional, right side fore head, she has HA everyday, especially for the past 6 month.   Dizziness with changing position bending head, she experiences 2-3 times a week. No Nausea, no vomitting.   Appetite: normal,  lb, Ht: 5'1'. Sleep 7 hours, Energy level low: past 6 month, tired all the time.     No breast tenderness, no galactorrhea.  Menstrual cycle: taking BCPs for 10 years. She was started because of irregular menses.     DM2 since 2014, takes  glipizide, dapaglifozin, and tresiba 90 units daily. Am morning 140-200 range.   A1C 8.0 in 9/2024.       , no biological children, no history of pregnancy. 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     Past Medical/Surgical History:  No past medical history on file.  Past Surgical History:   Procedure Laterality Date    IR LUMBAR PUNCTURE  2/13/2023       Allergies:  Allergies   Allergen Reactions    Metformin Diarrhea    Penicillins Rash     In childhood       Current Medications   Current Outpatient Medications   Medication Sig Dispense  Refill    acetaminophen (TYLENOL) 500 MG tablet Take 1,000 mg by mouth      dapagliflozin (FARXIGA) 10 MG TABS tablet Take 1 tablet by mouth every morning      gabapentin (NEURONTIN) 300 MG capsule TAKE 2 CAPSULES BY MOUTH IN THE MORNING AND 1 CAPSULE IN THE EVENING      glipiZIDE (GLUCOTROL) 10 MG tablet Take 10 mg by mouth      ibuprofen (ADVIL/MOTRIN) 200 MG tablet Take 400 mg by mouth      insulin degludec (TRESIBA) 200 UNIT/ML pen Inject 90 Units Subcutaneous      levonorgestrel-ethinyl estradiol (NORDETTE) 0.15-30 MG-MCG tablet Take 1 tablet by mouth at bedtime      Semaglutide (RYBELSUS) 7 MG tablet Take 7 mg by mouth      traMADol (ULTRAM) 50 MG tablet Take 50 mg by mouth      fluticasone (FLONASE) 50 MCG/ACT nasal spray Spray 2 sprays in nostril      methocarbamol (ROBAXIN) 500 MG tablet Take 500-1,000 mg by mouth       No current facility-administered medications for this visit.       Family History:  No family history on file.    Social History:  Social History     Tobacco Use    Smoking status: Never    Smokeless tobacco: Never   Substance Use Topics    Alcohol use: Not on file       ROS:  Full review of systems taken with the help of the intake sheet. Otherwise a complete 14 point review of systems was taken and is negative unless stated in the history above.    Physical Exam:   Vitals: There were no vitals taken for this visit.  BMI= There is no height or weight on file to calculate BMI.   General: well appearing, no acute distress, pleasant and conversant,   Mental Status/neuro: alert and oriented  Face: symmetrical, normal facial color  Eyes: anicteric, no proptosis or lid lag  Resp: normally breathing      Labs : I reviewed data from epic and extract and summarize the pertinent data here.      Latest Reference Range & Units 09/26/24 17:33   Adrenal Corticotropin <47 pg/mL 18   Cortisol Serum ug/dL 10.2   Free T3 2.0 - 4.4 pg/mL 3.6   FSH mIU/mL 0.7   Luteinizing Hormone mIU/mL <0.3   Progesterone  "ng/mL 0.1   Prolactin 5 - 23 ng/mL 163 (H)   T4 Free 0.90 - 1.70 ng/dL 1.28   TSH 0.30 - 4.20 uIU/mL 3.43   Ins Growth Factor 1 83 - 280 ng/mL 200   (H): Data is abnormally high  No results found for: \"NA\"   No results found for: \"POTASSIUM\"  No results found for: \"CHLORIDE\"  No results found for: \"NILES\"  No results found for: \"CO2\"  No results found for: \"BUN\"  No results found for: \"CR\"  No results found for: \"GLC\"  Lab Results   Component Value Date    TSH 3.43 09/26/2024     Lab Results   Component Value Date    T4 1.28 09/26/2024     No results found for: \"A1C\"    No results found for: \"IGF1\"  No results found for: \"LH\"  Lab Results   Component Value Date    FSH 0.7 09/26/2024     No results found for: \"ESTROGEN\"  No results found for: \"PROLACTIN\"        MRI Brain: I personally reviewed the original images and agree with the below reports.   Results for orders placed during the hospital encounter of 09/25/24    MR Brain w/o & w Contrast    Narrative  EXAM: MR BRAIN W/O and W CONTRAST  LOCATION: [facility]  DATE: 9/25/2024    INDICATION: Brain lesion  COMPARISON: Cervical spine MRI dated 9/23/2024  CONTRAST: 9 mL Gadavist  TECHNIQUE: Multiplanar multisequence head MRI without and with intravenous contrast including dedicated imaging of the sella.    FINDINGS:  SELLA: Dedicated non-dynamic imaging of the sella was performed. A lobulated right-sided mass expanding the sella which demonstrates T1-isointense, slightly T2-hyperintense signal intensity relative to gray matter and diffuse slightly heterogeneous  enhancement measuring approximately 2.4 x 1.8 x 2.1 cm (TRV x AP x CC ). Suprasellar extension of the mass with leftward displacement and questionable involvement of the pituitary infundibulum (series 10 image 7). Additionally, superior displacement of  the optic chiasm and prechiasmatic optic nerve on the right without definite invasion. Invasion of the cavernous sinus on the right " with near-complete encasement of the cavernous right ICA. Maintained right ICA flow void. No discrete dural tail. Partial  effacement of the right Meckel's cave by the mass without discrete invasion. Notably, a residual normal adenohypophysis is seen on the left (series 10 image 7). No posterior pituitary bright spot, which may be obscured by the mass. No evidence of acute  intralesional hemorrhage. No extension into the prepontine cistern.    INTRACRANIAL CONTENTS: No acute/subacute infarct, acute hemorrhage, or extra-axial fluid collection. No intra-axial mass or abnormal enhancement. Normal brain parenchymal signal. Normal ventricles and sulci for age. Normal position of the cerebellar  tonsils.    OTHER: Again noted partially imaged asymmetrically prominent subcentimeter ovoid right parotid space soft tissue nodule, likely asymmetrically enlarged lymph nodes, nonspecific. A subcentimeter rim-enhancing lesion within the right occipital scalp,  nonspecific although probably a sebaceous cyst; correlate with direct inspection.    Impression  IMPRESSION:  1.  A slightly lobulated enhancing right-sided mass expanding the sella with suprasellar extension resulting in leftward displacement and questionable involvement of the pituitary infundibulum. Residual adenohypophysis tissue noted on the left.  Right-sided mass effect upon and superior displacement of the optic chiasm and prechiasmatic optic nerve as well as cavernous sinus invasion and near-complete cavernous ICA encasement. Differential considerations include a pituitary macroadenoma or  adenohypophysitis (e.g., lymphocytic, granulomatous, or IgG for-related hypophysitis). Correlate with history and laboratory assessment. Meningioma and craniopharyngioma are felt less likely.  2.  No superimposed acute intracranial abnormality.          Assessment and Plan  33 year old female with sellar mass with left eye blurry vision,     Sellar mass  Radiology report  it was read adenoma vs hypophysitis. I did personal review of the images, I think it is more favor to adenoma. Also heterogenous part, and PRL level, could be prolatinoma with low potent producing PRL, I discussed with Dr. Ybarra and we will try first cabergoline and reassess to consider surgical option      - cabergline 0.5 mg twice a week    - repeat MRI in 3 months, if no improvement or more urgent impression by neuroophthalmologist then  immediately back to Dr. Ybarra for consideration for the surgery    RTC with me in 4 month       65 minutes spent on the date of the encounter doing chart review, history and exam, personal review of imaging, outside record, documentation and further activities as noted above.    Note: Chart documentation done in part with Dragon Voice Recognition software. Although reviewed after completion, some word and grammatical errors may remain.  Please consider this when interpreting information in this chart     Adilia Cortez MD  Staff Physician  Endocrinology and Metabolism  License: [id number]

## 2024-10-07 DIAGNOSIS — H53.40 VISUAL FIELD DEFECT: Primary | ICD-10-CM

## 2024-10-08 ENCOUNTER — OFFICE VISIT (OUTPATIENT)
Dept: OPHTHALMOLOGY | Facility: CLINIC | Age: 34
End: 2024-10-08
Attending: NEUROLOGICAL SURGERY
Payer: COMMERCIAL

## 2024-10-08 ENCOUNTER — LAB (OUTPATIENT)
Dept: LAB | Facility: CLINIC | Age: 34
End: 2024-10-08
Attending: NEUROLOGICAL SURGERY
Payer: COMMERCIAL

## 2024-10-08 DIAGNOSIS — H47.20 OPTIC ATROPHY: ICD-10-CM

## 2024-10-08 DIAGNOSIS — D49.7 PITUITARY TUMOR: Primary | ICD-10-CM

## 2024-10-08 DIAGNOSIS — D35.2 PITUITARY ADENOMA WITH EXTRASELLAR EXTENSION (H): ICD-10-CM

## 2024-10-08 LAB — PROLACTIN SERPL 3RD IS-MCNC: 11 NG/ML (ref 5–23)

## 2024-10-08 PROCEDURE — 92083 EXTENDED VISUAL FIELD XM: CPT | Performed by: OPHTHALMOLOGY

## 2024-10-08 PROCEDURE — 92133 CPTRZD OPH DX IMG PST SGM ON: CPT | Performed by: OPHTHALMOLOGY

## 2024-10-08 PROCEDURE — 99244 OFF/OP CNSLTJ NEW/EST MOD 40: CPT | Mod: GC | Performed by: OPHTHALMOLOGY

## 2024-10-08 PROCEDURE — 84146 ASSAY OF PROLACTIN: CPT

## 2024-10-08 PROCEDURE — 99214 OFFICE O/P EST MOD 30 MIN: CPT | Performed by: OPHTHALMOLOGY

## 2024-10-08 PROCEDURE — 36415 COLL VENOUS BLD VENIPUNCTURE: CPT

## 2024-10-08 ASSESSMENT — CONF VISUAL FIELD
OS_SUPERIOR_TEMPORAL_RESTRICTION: 0
OS_NORMAL: 1
OS_INFERIOR_NASAL_RESTRICTION: 0
OD_INFERIOR_NASAL_RESTRICTION: 0
METHOD: COUNTING FINGERS
OS_INFERIOR_TEMPORAL_RESTRICTION: 0
OD_SUPERIOR_NASAL_RESTRICTION: 0
OD_NORMAL: 1
OD_SUPERIOR_TEMPORAL_RESTRICTION: 0
OS_SUPERIOR_NASAL_RESTRICTION: 0
OD_INFERIOR_TEMPORAL_RESTRICTION: 0

## 2024-10-08 ASSESSMENT — CUP TO DISC RATIO
OD_RATIO: 0.15
OS_RATIO: 0.2

## 2024-10-08 ASSESSMENT — VISUAL ACUITY
OD_PH_SC+: +1
OS_PH_SC+: -3
METHOD: SNELLEN - LINEAR
OD_SC+: -1
METHOD_MR: DIAGNOSTIC PURPOSES
OD_SC: 20/40
OS_SC+: +1
OS_PH_SC: 20/25
OS_SC: 20/30
OD_PH_SC: 20/40

## 2024-10-08 ASSESSMENT — EXTERNAL EXAM - LEFT EYE: OS_EXAM: NORMAL

## 2024-10-08 ASSESSMENT — TONOMETRY
IOP_METHOD: ICARE
OS_IOP_MMHG: 21
OD_IOP_MMHG: 21

## 2024-10-08 ASSESSMENT — REFRACTION_MANIFEST
OS_CYLINDER: SPHERE
OS_SPHERE: -1.00
OD_CYLINDER: SPHERE
OD_SPHERE: -1.75

## 2024-10-08 ASSESSMENT — SLIT LAMP EXAM - LIDS
COMMENTS: NORMAL
COMMENTS: NORMAL

## 2024-10-08 ASSESSMENT — EXTERNAL EXAM - RIGHT EYE: OD_EXAM: NORMAL

## 2024-10-08 NOTE — PROGRESS NOTES
Sofia Hdz is a 33 year old female with the following diagnoses:   1. Pituitary tumor    2. Optic atrophy         Patient was sent for consultation by Dr. Ybarra for new sellar mass.     HPI:    Patient with newly discovered sellar mass.     The mass was discovered on MRI Cervical for evaluation of neck pain. Patient evaluated by neurosurgery (Dr. Ybarra) and endocrinology (Dr. Cortez). Patient describes a dull ache in the right forehead and maxilla, with throbbing frontal and occipital headaches as well. She notes that she used to have very irregular menstrual cycles her entire life. She started OCP in response which has been helping. Endocrine panel with elevated prolactin of 163 and low LH/FSH. Patient endorses hot flashes.     Patient describes 6 months months of vision symptoms, including occasional right eyelid twitching, intermittent visual distortion, and feeling like there is a film of water in front of her eyes. Denies any double vision. States that all her symptoms come and go. States that this is her first eye exam.     Patient with history of T2DM. Patient's last hemoglobin A1c was 8.8 two months ago.         Independent historians:  Patient and spouse    Review of outside testing:    MRI Brain with and without 9/30/24    IMPRESSION:  1.  A slightly lobulated enhancing right-sided mass expanding the sella with suprasellar extension resulting in leftward displacement and questionable involvement of the pituitary infundibulum. Residual adenohypophysis tissue noted on the left.   Right-sided mass effect upon and superior displacement of the optic chiasm and prechiasmatic optic nerve as well as cavernous sinus invasion and near-complete cavernous ICA encasement. Differential considerations include a pituitary macroadenoma or   adenohypophysitis (e.g., lymphocytic, granulomatous, or IgG for-related hypophysitis). Correlate with history and laboratory assessment. Meningioma and craniopharyngioma are  felt less likely.  2.  No superimposed acute intracranial abnormality.    My interpretation performed today of outside testing:  I have independently reviewed the MRI from September 2024 and this demonstrated contact between the pituitary adenoma and the chiasm.  There is not significant distortion of the chiasm.        Review of outside clinical notes:    9/30/24 -- Visit with Dr. Ybarra        10/4/2024 -- Visit with Dr. Cortez           Past medical history:  Diabetes mellitus       Medications:   acetaminophen  cabergoline  dapagliflozin Tabs  fluticasone  gabapentin  glipiZIDE  ibuprofen  insulin degludec  levonorgestrel-ethinyl estradiol  methocarbamol  Semaglutide  traMADol      Family history / social history:  Patient's adopted so unsure of family history but states that heart diseases run in biological dad's family.     Patient  reports that she has never smoked. She has never used smokeless tobacco.       Exam:  Visual acuity 20/40 right eye 20/20 left eye.  Color vision 11/11 right eye and 11/11 left eye.  Pupils round and reactive with no APD.  Intraocular pressure 21 right eye and 22 left eye.  Anterior segment exam normal.  Fundus exam normal.    Tests ordered and interpreted today:    Glaucoma Top OU          Performed by: BD   . Patient cooperation: Reliable   .   Good fix,not dilated.     Right Eye  Reliability of the test: Good   . Test Findings Free Text: nonspecific changes   . MD: 0.2   . Interpretation: Normal   . Plan: Monitor   . Interval: Initial   .     Left Eye  Reliability of the test: Good   . Test Findings Free Text: possible superotemporal depression   . MD: -1.4   . Interpretation: Normal   . Plan: Monitor   . Interval: Initial   .          OCT Optic Nerve RNFL Spectralis OU (both eyes)          Performed by: sugar   .     Right Eye  Reliability of the test: Good   . Test Findings: Normal   . Average Thickness Value: 107   . Plan: Monitor   . Interval: Initial   .     Left Eye  Reliability  of the test: Good   . Test Findings: Normal   . Average Thickness Value: 107   . Plan: Monitor   . Interval: Initial   .              Discussion of management / interpretation with another provider:   None    Assessment/Plan:   It is my impression that patient has a suprasellar mass and a mildly elevated prolactin level.  She was recently started on cabergoline.  She is going to have another MRI in 3 months.  As far as her vision is concerned she is correctable to 20/20 in each eye.  Her visual field shows a possible temporal visual field defect in the left eye.  The visual field in the right eye is completely normal.  There is mild loss of the ganglion cell layer in both eyes but no loss of the retinal nerve fiber layer.    She has some mild changes to both optic nerves and a relatively normal visual field in both eyes.  It is not definitively clear that the suprasellar mass is causing changes to her optic nerves or not.  She is going to have another MRI in 3 months, I think it is reasonable to see her again for repeat visual fields and an OCT in 3 months.  If there is progression on either of these tests, then it would argue that the suprasellar mass is causing changes to her anterior visual pathway.  I have asked her to call me sooner if she develops any worsening of her vision.            Attending Physician Attestation:  Complete documentation of historical and exam elements from today's encounter can be found in the full encounter summary report (not reduplicated in this progress note).  I personally obtained the chief complaint(s) and history of present illness.  I confirmed and edited as necessary the review of systems, past medical/surgical history, family history, social history, and examination findings as documented by others; and I examined the patient myself.  I personally reviewed the relevant tests, images, and reports as documented above.  I formulated and edited as necessary the assessment and plan  and discussed the findings and management plan with the patient and family. I personally reviewed the ophthalmic test(s) associated with this encounter, agree with the interpretation(s) as documented by the resident/fellow, and have edited the corresponding report(s) as necessary.  - Jay Soto MD  PGY-3 Ophthalmology

## 2024-10-08 NOTE — LETTER
2024     RE:  :  MRN: Sofia Hdz  1990  [id number]     Dear Dr. Ybarra    Thank you for asking me to see your very pleasant patient,Sofia Hdz, in neuro-ophthalmic consultation.  I would like to thank you for sending your records and I have summarized them in the history of present illness.   My assessment and plan are below.  For further details, please see my attached clinic note.      Sofia Hdz is a 33 year old female with the following diagnoses:   1. Pituitary tumor    2. Optic atrophy       Patient was sent for consultation by Dr. Ybarra for new sellar mass.     HPI:  Patient with newly discovered sellar mass.     The mass was discovered on MRI Cervical for evaluation of neck pain. Patient evaluated by neurosurgery (Dr. Ybarra) and endocrinology (Dr. Cortez). Patient describes a dull ache in the right forehead and maxilla, with throbbing frontal and occipital headaches as well. She notes that she used to have very irregular menstrual cycles her entire life. She started OCP in response which has been helping. Endocrine panel with elevated prolactin of 163 and low LH/FSH. Patient endorses hot flashes.     Patient describes 6 months months of vision symptoms, including occasional right eyelid twitching, intermittent visual distortion, and feeling like there is a film of water in front of her eyes. Denies any double vision. States that all her symptoms come and go. States that this is her first eye exam.     Patient with history of T2DM. Patient's last hemoglobin A1c was 8.8 two months ago.     Independent historians: Patient and spouse    Review of outside testing:  MRI Brain with and without 24    IMPRESSION:  1.  A slightly lobulated enhancing right-sided mass expanding the sella with suprasellar extension resulting in leftward displacement and questionable involvement of the pituitary infundibulum. Residual adenohypophysis tissue noted on the left.   Right-sided mass  effect upon and superior displacement of the optic chiasm and prechiasmatic optic nerve as well as cavernous sinus invasion and near-complete cavernous ICA encasement. Differential considerations include a pituitary macroadenoma or   adenohypophysitis (e.g., lymphocytic, granulomatous, or IgG for-related hypophysitis). Correlate with history and laboratory assessment. Meningioma and craniopharyngioma are felt less likely.  2.  No superimposed acute intracranial abnormality.    My interpretation performed today of outside testing: I have independently reviewed the MRI from September 2024 and this demonstrated contact between the pituitary adenoma and the chiasm.  There is not significant distortion of the chiasm.    Review of outside clinical notes:  9/30/24 -- Visit with Dr. Ybarra    10/4/2024 -- Visit with Dr. Cortez       Past medical history:  Diabetes mellitus     Medications:   acetaminophen  cabergoline  dapagliflozin Tabs  fluticasone  gabapentin  glipiZIDE  ibuprofen  insulin degludec  levonorgestrel-ethinyl estradiol  methocarbamol  Semaglutide  traMADol      Family history / social history:  Patient's adopted so unsure of family history but states that heart diseases run in biological dad's family.     Patient  reports that she has never smoked. She has never used smokeless tobacco.     Exam:  Visual acuity 20/40 right eye 20/20 left eye.  Color vision 11/11 right eye and 11/11 left eye.  Pupils round and reactive with no APD.  Intraocular pressure 21 right eye and 22 left eye.  Anterior segment exam normal.  Fundus exam normal.    Tests ordered and interpreted today:    Glaucoma Top OU    Performed by: BD   . Patient cooperation: Reliable   .   Good fix,not dilated.     Right Eye  Reliability of the test: Good   . Test Findings Free Text: nonspecific changes   . MD: 0.2   . Interpretation: Normal   . Plan: Monitor   . Interval: Initial   .     Left Eye  Reliability of the test: Good   . Test Findings Free  Text: possible superotemporal depression   . MD: -1.4   . Interpretation: Normal   . Plan: Monitor   . Interval: Initial   OCT Optic Nerve RNFL Spectralis OU (both eyes)    Performed by: rp   .   Right Eye  Reliability of the test: Good   . Test Findings: Normal   . Average Thickness Value: 107   . Plan: Monitor   . Interval: Initial   .     Left Eye  Reliability of the test: Good   . Test Findings: Normal   . Average Thickness Value: 107   . Plan: Monitor   . Interval: Initial   .   Discussion of management / interpretation with another provider:  None    Assessment/Plan: It is my impression that patient has a suprasellar mass and a mildly elevated prolactin level.  She was recently started on cabergoline.  She is going to have another MRI in 3 months.  As far as her vision is concerned she is correctable to 20/20 in each eye.  Her visual field shows a possible temporal visual field defect in the left eye.  The visual field in the right eye is completely normal.  There is mild loss of the ganglion cell layer in both eyes but no loss of the retinal nerve fiber layer.    She has some mild changes to both optic nerves and a relatively normal visual field in both eyes.  It is not definitively clear that the suprasellar mass is causing changes to her optic nerves or not.  She is going to have another MRI in 3 months, I think it is reasonable to see her again for repeat visual fields and an OCT in 3 months.  If there is progression on either of these tests, then it would argue that the suprasellar mass is causing changes to her anterior visual pathway.  I have asked her to call me sooner if she develops any worsening of her vision.    Again, thank you for allowing me to participate in the care of your patient.      Sincerely,    Jay Garcia MD  Professor  Director of Neuro-Ophthalmology  [facility] Endowed Chair  Departments of Ophthalmology, Neurology, and Neurosurgery  [facility] [id number]  [place]  [address]  T - [phone]  F - [phone]  FLAVIA Sadler [email or web address]      CC: Oscar Ybarra MD  [id number]  [address] [address]  Via In Basket    DX = pituitary adenoma, loss of the ganglion cell layer

## 2024-10-08 NOTE — NURSING NOTE
Chief Complaint(s) and History of Present Illness(es)       New Patient    In both eyes.  Onset was gradual.  Occurring intermittently.  Since onset it is stable.  Associated symptoms include double vision, eye pain, tearing and headache.  Pain was noted as 0/10. Additional comments: Sofia Hdz is a 33 year old female sent for consultation by Dr. Ybarra for brain lesion.  Laurita reports she has been having eye twitches in the right eye.  Eyes have been watery, feels like she is looking through crystal vision.  She reports intermittent diplopia with constant blurry vision - diplopia can last up to about 10 minutes, blurry vision. Has pressure sensation over the right eye.  Worsening headaches within the last 6 months.   PRISCILA Abraham 10/8/2024 7:16 AM

## 2024-10-09 ENCOUNTER — MYC MEDICAL ADVICE (OUTPATIENT)
Dept: ENDOCRINOLOGY | Facility: CLINIC | Age: 34
End: 2024-10-09
Payer: COMMERCIAL

## 2024-10-16 ENCOUNTER — TELEPHONE (OUTPATIENT)
Dept: NEUROSURGERY | Facility: CLINIC | Age: 34
End: 2024-10-16

## 2024-10-16 NOTE — TELEPHONE ENCOUNTER
Date: October 16, 2024    Provider: Dr. Rowan Sadler DO     Provider/Other:     Reason for out-going call: REFERRAL/CONSULT      Detailed message: Maxed attempt letter sent out. Attempt 3x to reach patient to call back and schedule EMG with Dr. Hart at the [facility].  10/16/24 LM to call and schedule bw  10/4/24 Not available, will call back when interested-shweta  9/26/24 LM to schedule w/ Dr. hart -shweta

## 2024-10-26 ENCOUNTER — LAB (OUTPATIENT)
Dept: LAB | Facility: CLINIC | Age: 34
End: 2024-10-26
Payer: COMMERCIAL

## 2024-10-26 DIAGNOSIS — D35.2 PITUITARY ADENOMA (H): ICD-10-CM

## 2024-10-26 LAB — PROLACTIN SERPL 3RD IS-MCNC: 4 NG/ML (ref 5–23)

## 2024-10-26 PROCEDURE — 36415 COLL VENOUS BLD VENIPUNCTURE: CPT

## 2024-10-26 PROCEDURE — 84146 ASSAY OF PROLACTIN: CPT

## 2024-11-07 ENCOUNTER — OFFICE VISIT (OUTPATIENT)
Dept: NEUROSURGERY | Facility: CLINIC | Age: 34
End: 2024-11-07
Attending: NEUROLOGICAL SURGERY
Payer: COMMERCIAL

## 2024-11-07 VITALS — DIASTOLIC BLOOD PRESSURE: 79 MMHG | SYSTOLIC BLOOD PRESSURE: 120 MMHG | OXYGEN SATURATION: 97 % | HEART RATE: 92 BPM

## 2024-11-07 DIAGNOSIS — G93.9 BRAIN LESION: Primary | ICD-10-CM

## 2024-11-07 PROCEDURE — 99213 OFFICE O/P EST LOW 20 MIN: CPT | Performed by: NEUROLOGICAL SURGERY

## 2024-11-07 ASSESSMENT — PAIN SCALES - GENERAL: PAINLEVEL_OUTOF10: NO PAIN (0)

## 2024-11-07 NOTE — PATIENT INSTRUCTIONS
Ordered a repeat MRI scan. They will call to schedule for you.  [place]: [phone]  [phone]       [place]: [phone]     Please schedule within 2 weeks. Once your imaging exam has been scheduled, our prior authorization team will connect with your insurance to ensure coverage of the exam. They will only reach out to you if a prior authorization is denied.  Please schedule your imaging exam at least 7 days out so our team has time to complete this process.  Failure to do so could result in insurance denial and you becoming responsible for the cost of the exam.   We will call you with the results. If you don't hear from us 7 days after the scan, please call us.    Please schedule a telephone visit with Dr. Ybarra 2-3 days after your new MRI date.    [facility]  [facility] - [address] [address]  Telephone:  [phone]        Fax:  [phone]

## 2024-11-07 NOTE — PROGRESS NOTES
Sofia Hdz is a 33 year old female who presents for:  Chief Complaint   Patient presents with    Follow Up     Return Neuro / Kaushal tumor            Initial Vitals:  /79 (BP Location: Right arm, Patient Position: Sitting, Cuff Size: Adult Regular)   Pulse 92   SpO2 97%  There is no height or weight on file to calculate BMI.. There is no height or weight on file to calculate BSA. BP completed using cuff size: regular    Reanna Lyles

## 2024-11-07 NOTE — PROGRESS NOTES
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  Was discovered on MRI Cervical for evaluation of neck pain.  Patient describes a couple months of other symptoms, including right eyelid twitching, some non-specific right eye vision change, and dull burning pain in the right forehead and maxilla, with throbbing frontal and occipital headaches as well.  She notes that she used to have very irregular menstrual cycles prior to starting birth control a few years ago.  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    11/7/24:  Returns for follow up.  Continued headaches and vision changes.  She saw Neuro-Optho, who felt that she might have left eye temporal field loss, unclear if it was related to the tumor.  She did a month of cabergoline, and her prolactin has dropped to 4, but she could not continue due to severe side effects.       No past medical history on file.  Past Surgical History:   Procedure Laterality Date    IR LUMBAR PUNCTURE  2/13/2023         Physical Exam  /79 (BP Location: Right arm, Patient Position: Sitting, Cuff Size: Adult Regular)   Pulse 92   SpO2 97%     Mental status:  Alert and Oriented x 3, speech is fluent.  HEENT:  PERRL.  EOM s intact.  Visual fields full to gross exam  Cranial nerves:  II-XII intact.   Motor:    Shoulder Abduction:  Right:  5/5   Left:  5/5  Biceps:                      Right:  5/5   Left:  5/5  Triceps:                     Right:  5/5   Left:  5/5  Interosseus :             Right:  5/5   Left:  5/5  Hip Flexion:               Right: 5/5  Left:  5/5  Quadriceps:              Right:  5/5  Left:  5/5  Hamstrings:              Right:  5/5  Left:  5/5  Gastroc Soleus:        Right:  5/5  Left:  5/5  Tib/Ant:                      Right:  5/5  Left:  5/5  EHL:                          Right:  5/5  Left:   5/5  Sensation:  Intact  Reflexes:  Negative Zamarripa's.  Smooth tandem walking.      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

## 2024-11-19 ENCOUNTER — HOSPITAL ENCOUNTER (OUTPATIENT)
Dept: MRI IMAGING | Facility: CLINIC | Age: 34
Discharge: HOME | End: 2024-11-19
Attending: NEUROLOGICAL SURGERY
Payer: COMMERCIAL

## 2024-11-19 ENCOUNTER — HOSPITAL ENCOUNTER (EMERGENCY)
Facility: CLINIC | Age: 34
End: 2024-11-19
Payer: COMMERCIAL

## 2024-11-19 DIAGNOSIS — G93.9 BRAIN LESION: ICD-10-CM

## 2024-11-19 PROCEDURE — A9585 GADOBUTROL INJECTION: HCPCS | Performed by: NEUROLOGICAL SURGERY

## 2024-11-19 PROCEDURE — 255N000002 HC RX 255 OP 636: Performed by: NEUROLOGICAL SURGERY

## 2024-11-19 PROCEDURE — 70553 MRI BRAIN STEM W/O & W/DYE: CPT

## 2024-11-19 RX ORDER — GADOBUTROL 604.72 MG/ML
9.5 INJECTION INTRAVENOUS ONCE
Status: COMPLETED | OUTPATIENT
Start: 2024-11-19 | End: 2024-11-19

## 2024-11-19 RX ADMIN — GADOBUTROL 9.5 ML: 604.72 INJECTION INTRAVENOUS at 19:21

## 2024-11-21 ENCOUNTER — VIRTUAL VISIT (OUTPATIENT)
Dept: NEUROSURGERY | Facility: CLINIC | Age: 34
End: 2024-11-21
Payer: COMMERCIAL

## 2024-11-21 DIAGNOSIS — E23.7 PITUITARY LESION (H): Primary | ICD-10-CM

## 2024-11-21 NOTE — LETTER
11/21/2024      Sofia Hdz  [address] [facility] [address]      Dear Colleague,    Thank you for referring your patient, Sofia Hdz, to the [facility]. Please see a copy of my visit note below.    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  Was discovered on MRI Cervical for evaluation of neck pain.  Patient describes a couple months of other symptoms, including right eyelid twitching, some non-specific right eye vision change, and dull burning pain in the right forehead and maxilla, with throbbing frontal and occipital headaches as well.  She notes that she used to have very irregular menstrual cycles prior to starting birth control a few years ago.  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    11/7/24:  Returns for follow up.  Continued headaches and vision changes.  She saw Neuro-Optho, who felt that she might have left eye temporal field loss, unclear if it was related to the tumor.  She did a month of cabergoline, and her prolactin has dropped to 4, but she could not continue due to severe side effects.    11/21/24, Telephone encounter:   No new symptoms.  We reviewed MRI results, personally reviewed, which show the lesion to be slightly smaller, now abutting the right optic nerve and chiasm, but no longer displacing them.        No past medical history on file.  Past Surgical History:   Procedure Laterality Date     IR LUMBAR PUNCTURE  2/13/2023         Physical Exam  There were no vitals taken for this visit.    Mental status:  Alert and Oriented x 3, speech is fluent.  HEENT:  PERRL.  EOM s intact.  Visual fields full to gross exam  Cranial nerves:  II-XII intact.   Motor:    Shoulder Abduction:  Right:  5/5   Left:  5/5  Biceps:                      Right:  5/5   Left:   5/5  Triceps:                     Right:  5/5   Left:  5/5  Interosseus :             Right:  5/5   Left:  5/5  Hip Flexion:               Right: 5/5  Left:  5/5  Quadriceps:              Right:  5/5  Left:  5/5  Hamstrings:              Right:  5/5  Left:  5/5  Gastroc Soleus:        Right:  5/5  Left:  5/5  Tib/Ant:                      Right:  5/5  Left:  5/5  EHL:                          Right:  5/5  Left:  5/5  Sensation:  Intact  Reflexes:  Negative Zamarripa's.  Smooth tandem walking.      A/P:  33 year old female with sellar mass    We discussed that the mass is slightly smaller after the short interval of cabergoline; it is currently abutting the right optic nerve and chiasm, but no longer superiorly displacing them  Discussed options of surveillance versus MRI to prevent risk of future tumor progression and vision loss  She will discuss with her  and let us know what they decide  If she decides on surveillance, will repeat MRI in 4 months    5 minutes telephone discussion  Provider in clinic and patient off site            Again, thank you for allowing me to participate in the care of your patient.        Sincerely,        Oscar Ybarra MD

## 2024-11-21 NOTE — PROGRESS NOTES
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  Was discovered on MRI Cervical for evaluation of neck pain.  Patient describes a couple months of other symptoms, including right eyelid twitching, some non-specific right eye vision change, and dull burning pain in the right forehead and maxilla, with throbbing frontal and occipital headaches as well.  She notes that she used to have very irregular menstrual cycles prior to starting birth control a few years ago.  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    11/7/24:  Returns for follow up.  Continued headaches and vision changes.  She saw Neuro-Optho, who felt that she might have left eye temporal field loss, unclear if it was related to the tumor.  She did a month of cabergoline, and her prolactin has dropped to 4, but she could not continue due to severe side effects.    11/21/24, Telephone encounter:   No new symptoms.  We reviewed MRI results, personally reviewed, which show the lesion to be slightly smaller, now abutting the right optic nerve and chiasm, but no longer displacing them.        No past medical history on file.  Past Surgical History:   Procedure Laterality Date    IR LUMBAR PUNCTURE  2/13/2023         Physical Exam  There were no vitals taken for this visit.    Mental status:  Alert and Oriented x 3, speech is fluent.  HEENT:  PERRL.  EOM s intact.  Visual fields full to gross exam  Cranial nerves:  II-XII intact.   Motor:    Shoulder Abduction:  Right:  5/5   Left:  5/5  Biceps:                      Right:  5/5   Left:  5/5  Triceps:                     Right:  5/5   Left:  5/5  Interosseus :             Right:  5/5   Left:  5/5  Hip Flexion:               Right: 5/5  Left:  5/5  Quadriceps:              Right:  5/5  Left:  5/5  Hamstrings:              Right:  5/5  Left:   5/5  Gastroc Soleus:        Right:  5/5  Left:  5/5  Tib/Ant:                      Right:  5/5  Left:  5/5  EHL:                          Right:  5/5  Left:  5/5  Sensation:  Intact  Reflexes:  Negative Zamarripa's.  Smooth tandem walking.      A/P:  33 year old female with sellar mass    We discussed that the mass is slightly smaller after the short interval of cabergoline; it is currently abutting the right optic nerve and chiasm, but no longer superiorly displacing them  Discussed options of surveillance versus MRI to prevent risk of future tumor progression and vision loss  She will discuss with her  and let us know what they decide  If she decides on surveillance, will repeat MRI in 4 months    5 minutes telephone discussion  Provider in clinic and patient off site

## 2024-11-25 ENCOUNTER — TELEPHONE (OUTPATIENT)
Dept: NEUROSURGERY | Facility: CLINIC | Age: 34
End: 2024-11-25
Payer: COMMERCIAL

## 2024-11-25 DIAGNOSIS — G93.89 SUPRASELLAR MASS: Primary | ICD-10-CM

## 2024-11-25 NOTE — TELEPHONE ENCOUNTER
Spoke with patient about surgery dates. After surgery was scheduled I left a message for patient giving her surgery details for her surgery with Dr. Ybarra on 1/7

## 2024-11-27 ENCOUNTER — TELEPHONE (OUTPATIENT)
Dept: NEUROSURGERY | Facility: CLINIC | Age: 34
End: 2024-11-27
Payer: COMMERCIAL

## 2024-11-27 DIAGNOSIS — Z01.818 PREOP TESTING: Primary | ICD-10-CM

## 2024-11-27 RX ORDER — CHLORHEXIDINE GLUCONATE 40 MG/ML
SOLUTION TOPICAL SEE ADMIN INSTRUCTIONS
Qty: 236 ML | Refills: 0 | Status: SHIPPED | OUTPATIENT
Start: 2024-11-27

## 2024-11-27 NOTE — TELEPHONE ENCOUNTER
Placed call to pt to do surgery education -    Sent soap to pharmacy, sent edu to [facility].    Attempted to reach out to patient, no answer. Left voice message for them to call clinic back to further discuss.      Patient Education    Surgery scheduled at [facility] for Endoscopic endonasal resection of pituitary mass on 12/7    Pre-Operative:    You will need Stereotactic MRI w contrast ([id number]) and Stealth CT head w/o contrast (YBV719 -STEALTH PROTOCOL) 1-5 days prior to surgery. Call to schedule.     [facility]: [phone]         [facility] [place]: [phone]         [facility]: [phone]       Surgical risks: blood clots in the leg or lung, problems urinating, nerve damage, drainage from the incision, infection, stiffness    You will need a Pre-operative physical with primary care physician within 30 days of surgical date    You will spend 2-3 nights in hospital     Please use the surgical soap to shower as instructed in the 'Showering Before Surgery' printout (located in your surgery folder).    Smoking Cessation: You are advised to quit smoking immediately through recovery to help with healing and reduce risk of complications. Printout given.    Eating/Drinking  Stop all solid foods and liquids 8 hours prior to surgery    Medications  Injectable: Semaglutide/Ozempic/Wegovy, Trulicity, Mounjaro  Need to be held 2 weeks prior to surgery due to risk for aspiration because of slowed gastric emptying.  Discontinue Aspirin & NSAIDs (Advil, Ibuprofen, Naproxen, Nuprin, Diclofenac, Meloxicam, Aleve, Celebrex) 7 days prior to surgery. After surgery, do not begin taking these medications until given clearance as it may cause bleeding and interfere with healing.  It is ok to take Tylenol (Acetaminophen) for pain within the 7 days prior to surgery. Example: you could take 1000 mg 3 times per day. Do not exceed 3,000 mg per day.   Discontinue Plavix x 7-10 days prior to surgical date, stay  off Plavix 3-4 days post operatively  Discontinue Xarelto 5-7 days prior to surgery  Discontinue coumadin/warfarin 5-7 days prior to surgery. INR needs to be <1.4.  If you are on chronic pain medication (oxycodone, Percocet, hydrocodone, Vicodin, Norco, Dilaudid, morphine, MS Contin, naltrexone, Suboxone, etc) or have a pain contract we will reach out to your pain clinic to gather your most recent records. Continue obtaining your pain medications from your current provider until surgery. Our team will manage your acute post-op pain in the hospital and during the recovery period. Your pain team will continue to manage your chronic pain. Please contact your provider who manages your pain medications to inform them of your upcoming surgery.  Any other medications prescribed, please discuss with your primary care provider at your pre-operative physical      COVID Vaccine: You may NOT receive the COVID-19 vaccine 72 hours before or after surgery.    Post-Operative:    Pain Management  Dealing with pain  Everyone feels pain differently, we may ask you to rate your pain using a pain scale. This will let us know how much pain you feel.   Keep in mind that medicine won't take away all of your pain. It helps to try other ways to relax and ease pain.   Things to help with pain  After surgery, we will give you medicine for your pain upon discharge from the hospital. These medications work well, but they can make you drowsy, itchy, or sick to your stomach. If we give you narcotics for pain, try to take the pills with food.   For mild to moderate pain, you can take medication such as Tylenol. These can be used with narcotics, but make sure that your narcotic does not contain Tylenol.   Do NOT drive while taking narcotic pain medication  Do NOT drink alcohol while using any pain medication  You can utilize ice as needed (no longer than 20 minutes at one time)  Refills: please call the neurosurgery clinic to request a few days  before you run out.    Bowel Care  Many people have constipation (hard stools) after surgery.  To help prevent constipation: Drink plenty of fluid (8-10 glasses/day); Eat more fiber, such as whole grain bread, bran cereal, and fruits and vegetables; Stay active by walking; Over the counter stool softener may also help.      Activity Restrictions  No coughing, sneezing, nose blowing, drinking from a straw for 2 weeks  No bending forward or lifting greater than 10 pounds for at least 4 weeks    Call the clinic right away if you develop Signs of Infection  Redness, swelling, warmth, drainage, and fever of 101 degrees or higher  You can expect bloody mucus and sinus symptoms for about 3 days after surgery    Go to the Emergency Room   Acute changes in the level of consciousness (increased confusion, memory loss, speech abnormalities)  Any change in hearing or vision  Increased headaches  New onset of seizures    Post-Op Follow Up Appointments  Appointment 3 weeks post-op with Dr. Wilmar Ospina. The nasal stents will be removed.  Will need MRI w & w/o contrast completed 1-2 days prior to 3 month post-op appointment with Dr. Ybarra  Please call to schedule follow up appointment at [phone]    Resources  If you are currently employed, you will need to be off work for 4 weeks for recovery and healing.  Please fax any FMLA/short term disability paperwork to [phone] prior to surgery  You may call our clinic when you'd like to return to work and we can provide a work letter  To allow staff adequate time to complete paperwork, please send as soon as possible     [facility]  [facility] - [address] [address]  Telephone:  [phone]       Fax:  [phone]

## 2024-12-01 ENCOUNTER — HEALTH MAINTENANCE LETTER (OUTPATIENT)
Age: 34
End: 2024-12-01

## 2024-12-03 ENCOUNTER — MYC MEDICAL ADVICE (OUTPATIENT)
Dept: PEDIATRICS | Facility: CLINIC | Age: 34
End: 2024-12-03
Payer: COMMERCIAL

## 2024-12-03 ENCOUNTER — TELEPHONE (OUTPATIENT)
Dept: ENDOCRINOLOGY | Facility: CLINIC | Age: 34
End: 2024-12-03
Payer: COMMERCIAL

## 2024-12-03 NOTE — TELEPHONE ENCOUNTER
Left Voicemail (1st Attempt) for the patient to call back and schedule the following:    Appointment type: return  Provider: dr. dominguez  Return date: 2/4/2025  Specialty phone number: [phone]   Additonal Notes:  Return in about 4 months (around 2/4/2025).      [facility]   Orthopedics, Podiatry, Sports Medicine, Ent ,Eye , Audiology, Adult Endocrine & Diabetes, Nutrition & Medication Therapy Management Specialties   [facility]

## 2025-01-02 ENCOUNTER — DOCUMENTATION ONLY (OUTPATIENT)
Dept: NEUROSURGERY | Facility: CLINIC | Age: 35
End: 2025-01-02
Payer: COMMERCIAL

## 2025-01-02 NOTE — PROGRESS NOTES
Completed pts Spousal FMLA for 1/2/25 appt and for surgery recovery 1/7/25-2/10/25 (same as pt).    Placed document in provider's mailbox at [facility] for review and signature.

## 2025-01-02 NOTE — PROGRESS NOTES
Finished FMLA form. Placed document in provider's mailbox at [facility] for review and signature.      Off 1/7/25-2/10/25

## 2025-01-03 RX ORDER — GABAPENTIN 100 MG/1
200 CAPSULE ORAL EVERY MORNING
COMMUNITY

## 2025-01-03 NOTE — PROGRESS NOTES
PTA medications updated by Medication Scribe prior to surgery via phone call with patient (last doses completed by Nurse)     Medication history sources: Patient, Surescripts, and H&P  In the past week, patient estimated taking medication this percent of the time: Greater than 90%      Significant changes made to the medication list:  Patient reports no longer taking the following meds (med scribe removed from PTA med list): Dostinex      Additional medication history information:   Patient was advised to bring: Flonase    Medication reconciliation completed by provider prior to medication history? No    Time spent in this activity: 30 minutes    The information provided in this note is only as accurate as the sources available at the time of update(s)      Prior to Admission medications    Medication Sig Last Dose Taking? Auth Provider Long Term End Date   acetaminophen (TYLENOL) 500 MG tablet Take 1,000 mg by mouth  Yes Reported, Patient     chlorhexidine (HIBICLENS) 4 % solution Apply topically See Admin Instructions.  Yes Oscar Ybarra MD     dapagliflozin (FARXIGA) 10 MG TABS tablet Take 1 tablet by mouth every morning Morning Yes Reported, Patient     fluticasone (FLONASE) 50 MCG/ACT nasal spray Spray 2 sprays in nostril  Yes Reported, Patient     gabapentin (NEURONTIN) 100 MG capsule Take 200 mg by mouth every morning. ([id number])  Yes Reported, Patient Yes    gabapentin (NEURONTIN) 300 MG capsule Take 600 mg by mouth at bedtime. ([id number]) Bedtime Yes Reported, Patient Yes    glipiZIDE (GLUCOTROL) 10 MG tablet Take 10 mg by mouth 2 times daily (before meals). Morning Yes Reported, Patient Yes    ibuprofen (ADVIL/MOTRIN) 200 MG tablet Take 400 mg by mouth every 6 hours as needed ([id number]).  Yes Reported, Patient     insulin degludec (TRESIBA) 200 UNIT/ML pen Inject 90 Units Subcutaneous Past Month Yes Reported, Patient     levonorgestrel-ethinyl estradiol (NORDETTE) 0.15-30 MG-MCG tablet  Take 1 tablet by mouth at bedtime Bedtime Yes Reported, Patient Yes    methocarbamol (ROBAXIN) 500 MG tablet Take 500-1,000 mg by mouth  Yes Reported, Patient     Semaglutide (RYBELSUS) 14 MG tablet Take 14 mg by mouth. 12/31/2024 Morning Yes Reported, Patient     traMADol (ULTRAM) 50 MG tablet Take 50 mg by mouth  Yes Reported, Patient         Medication history completed by: Mika Burden

## 2025-01-06 ENCOUNTER — ANESTHESIA EVENT (OUTPATIENT)
Dept: SURGERY | Facility: CLINIC | Age: 35
End: 2025-01-06
Payer: COMMERCIAL

## 2025-01-06 NOTE — ANESTHESIA PREPROCEDURE EVALUATION
Anesthesia Pre-Procedure Evaluation    Patient: Sofia Hdz   MRN: [id number] : 1990        Procedure : Procedure(s):  Endoscopic endonasal resection of pituitary mass with stealth          Past Medical History:   Diagnosis Date    Cervical radiculopathy     Cervical spinal stenosis     Cervical spondylosis     Chronic ulcer of left lower extremity with fat layer exposed (H)     Diabetes mellitus (H)     Limb pain     Neck pain     Necrobiosis lipoidica diabeticorum (H)     Obesity     Prolactinoma (H)     Pyoderma gangrenosum (H)       Past Surgical History:   Procedure Laterality Date    C4-5 FORAMINOTOMY AND  DECOMPRESSION Bilateral 2023    CERVICAL DISCECTOMY      IR LUMBAR PUNCTURE  2023      Allergies   Allergen Reactions    Amoxicillin Rash    Metformin Diarrhea    Penicillins Rash     In childhood      Social History     Tobacco Use    Smoking status: Never    Smokeless tobacco: Never   Substance Use Topics    Alcohol use: Yes     Comment: rare      Wt Readings from Last 1 Encounters:   24 95.6 kg (210 lb 11.2 oz)        Anesthesia Evaluation   Pt has had prior anesthetic.     History of anesthetic complications  - PONV.      ROS/MED HX  ENT/Pulmonary: Comment: Pituitary tumor with high prolactin levels.  Other hormones ok     24 MRI  IMPRESSION:     1.  Slight interval decrease in size of lobulated mass along the right aspect of the sella compared to study from 2024. Mass continues to abut the undersurface of the right optic chiasm and prechiasmatic right optic nerve with decreased mass   effect compared to last prior study.  2.  Again, mass invades the right cavernous sinus with circumferential encasement of the cavernous right ICA. Right cavernous ICA flow void is maintained. No evidence of left cavernous sinus invasion.  3.  No acute intracranial abnormality.     (-) sleep apnea   Neurologic:       Cardiovascular:       METS/Exercise Tolerance:    "  Hematologic:       Musculoskeletal: Comment: Sp cervical discectomy and prosthetic disc      GI/Hepatic:    (-) GERD   Renal/Genitourinary:       Endo:     (+)  type II DM (too 1/2 her long acting last night),   Using insulin,          Obesity (BMI 35),       Psychiatric/Substance Use:       Infectious Disease:       Malignancy:       Other:            Physical Exam    Airway        Mallampati: III   TM distance: > 3 FB   Neck ROM: full   Mouth opening: > 3 cm    Respiratory Devices and Support         Dental       (+) Minor Abnormalities - some fillings, tiny chips      Cardiovascular   cardiovascular exam normal          Pulmonary   pulmonary exam normal                OUTSIDE LABS:  CBC: No results found for: \"WBC\", \"HGB\", \"HCT\", \"PLT\"  BMP: No results found for: \"NA\", \"POTASSIUM\", \"CHLORIDE\", \"CO2\", \"BUN\", \"CR\", \"GLC\"  COAGS: No results found for: \"PTT\", \"INR\", \"FIBR\"  POC: No results found for: \"BGM\", \"HCG\", \"HCGS\"  HEPATIC: No results found for: \"ALBUMIN\", \"PROTTOTAL\", \"ALT\", \"AST\", \"GGT\", \"ALKPHOS\", \"BILITOTAL\", \"BILIDIRECT\", \"CESAR\"  OTHER:   Lab Results   Component Value Date    TSH 3.43 09/26/2024    T4 1.28 09/26/2024    SED 13 05/23/2024       Anesthesia Plan    ASA Status:  4    NPO Status:  NPO Appropriate    Anesthesia Type: General.     - Airway: ETT   Induction: Intravenous.   Maintenance: TIVA.   Techniques and Equipment:     - Airway: Video-Laryngoscope     - Lines/Monitors: 2nd IV, Arterial Line     Consents    Anesthesia Plan(s) and associated risks, benefits, and realistic alternatives discussed. Questions answered and patient/representative(s) expressed understanding.     - Discussed:     - Discussed with:  Patient            Postoperative Care    Pain management: IV analgesics, Multi-modal analgesia.   PONV prophylaxis: Ondansetron (or other 5HT-3)     Comments:    Other Comments: Preop glucose, intra-op glucometer  T&S  Propofol/aruna TIVA  Videoscope  Eileen  1-2 additional PIV's in OR, " depending on size  Cardiac basket, with nitroglycerin drawn up in syringe in event of any sudden HTN  latricia Garcia MD    I have reviewed the pertinent notes and labs in the chart from the past 30 days and (re)examined the patient.  Any updates or changes from those notes are reflected in this note.

## 2025-01-07 ENCOUNTER — ANESTHESIA (OUTPATIENT)
Dept: SURGERY | Facility: CLINIC | Age: 35
End: 2025-01-07
Payer: COMMERCIAL

## 2025-01-07 ENCOUNTER — HOSPITAL ENCOUNTER (INPATIENT)
Facility: CLINIC | Age: 35
End: 2025-01-07
Attending: NEUROLOGICAL SURGERY | Admitting: NEUROLOGICAL SURGERY
Payer: COMMERCIAL

## 2025-01-07 DIAGNOSIS — G93.89 SUPRASELLAR MASS: Primary | ICD-10-CM

## 2025-01-07 LAB
ABO + RH BLD: NORMAL
BLD GP AB SCN SERPL QL: NEGATIVE
GLUCOSE BLDC GLUCOMTR-MCNC: 197 MG/DL (ref 70–99)
GLUCOSE BLDC GLUCOMTR-MCNC: 201 MG/DL (ref 70–99)
GLUCOSE BLDC GLUCOMTR-MCNC: 203 MG/DL (ref 70–99)
GLUCOSE BLDC GLUCOMTR-MCNC: 210 MG/DL (ref 70–99)
GLUCOSE BLDC GLUCOMTR-MCNC: 216 MG/DL (ref 70–99)
GLUCOSE SERPL-MCNC: 163 MG/DL (ref 70–99)
GLUCOSE SERPL-MCNC: 249 MG/DL (ref 70–99)
HCG SERPL QL: NEGATIVE
PATH REPORT.COMMENTS IMP SPEC: NORMAL
PATH REPORT.INTRAOP OBS SPEC DOC: NORMAL
POTASSIUM SERPL-SCNC: 3.8 MMOL/L (ref 3.4–5.3)
SODIUM SERPL-SCNC: 141 MMOL/L (ref 135–145)
SP GR UR STRIP: 1.03 (ref 1–1.03)
SPECIMEN EXP DATE BLD: NORMAL

## 2025-01-07 PROCEDURE — 84295 ASSAY OF SERUM SODIUM: CPT | Performed by: NURSE PRACTITIONER

## 2025-01-07 PROCEDURE — 999N000141 HC STATISTIC PRE-PROCEDURE NURSING ASSESSMENT: Performed by: NEUROLOGICAL SURGERY

## 2025-01-07 PROCEDURE — 370N000017 HC ANESTHESIA TECHNICAL FEE, PER MIN: Performed by: NEUROLOGICAL SURGERY

## 2025-01-07 PROCEDURE — 258N000003 HC RX IP 258 OP 636: Performed by: ANESTHESIOLOGY

## 2025-01-07 PROCEDURE — 88307 TISSUE EXAM BY PATHOLOGIST: CPT | Mod: 26 | Performed by: SPECIALIST

## 2025-01-07 PROCEDURE — 250N000011 HC RX IP 250 OP 636

## 2025-01-07 PROCEDURE — 250N000013 HC RX MED GY IP 250 OP 250 PS 637: Performed by: ANESTHESIOLOGY

## 2025-01-07 PROCEDURE — 258N000003 HC RX IP 258 OP 636

## 2025-01-07 PROCEDURE — 82947 ASSAY GLUCOSE BLOOD QUANT: CPT | Performed by: ANESTHESIOLOGY

## 2025-01-07 PROCEDURE — 250N000009 HC RX 250: Performed by: NEUROLOGICAL SURGERY

## 2025-01-07 PROCEDURE — 250N000009 HC RX 250

## 2025-01-07 PROCEDURE — 710N000010 HC RECOVERY PHASE 1, LEVEL 2, PER MIN: Performed by: NEUROLOGICAL SURGERY

## 2025-01-07 PROCEDURE — 8E09XBZ COMPUTER ASSISTED PROCEDURE OF HEAD AND NECK REGION: ICD-10-PCS | Performed by: NEUROLOGICAL SURGERY

## 2025-01-07 PROCEDURE — 61781 SCAN PROC CRANIAL INTRA: CPT | Performed by: NEUROLOGICAL SURGERY

## 2025-01-07 PROCEDURE — 250N000011 HC RX IP 250 OP 636: Performed by: ANESTHESIOLOGY

## 2025-01-07 PROCEDURE — 200N000001 HC R&B ICU

## 2025-01-07 PROCEDURE — 86900 BLOOD TYPING SEROLOGIC ABO: CPT | Performed by: ANESTHESIOLOGY

## 2025-01-07 PROCEDURE — 250N000012 HC RX MED GY IP 250 OP 636 PS 637: Performed by: ANESTHESIOLOGY

## 2025-01-07 PROCEDURE — 84132 ASSAY OF SERUM POTASSIUM: CPT | Performed by: ANESTHESIOLOGY

## 2025-01-07 PROCEDURE — 36415 COLL VENOUS BLD VENIPUNCTURE: CPT | Performed by: ANESTHESIOLOGY

## 2025-01-07 PROCEDURE — 360N000079 HC SURGERY LEVEL 6, PER MIN: Performed by: NEUROLOGICAL SURGERY

## 2025-01-07 PROCEDURE — 05BL4ZZ EXCISION OF INTRACRANIAL VEIN, PERCUTANEOUS ENDOSCOPIC APPROACH: ICD-10-PCS | Performed by: NEUROLOGICAL SURGERY

## 2025-01-07 PROCEDURE — 250N000013 HC RX MED GY IP 250 OP 250 PS 637: Performed by: NURSE PRACTITIONER

## 2025-01-07 PROCEDURE — 88307 TISSUE EXAM BY PATHOLOGIST: CPT | Mod: TC | Performed by: NEUROLOGICAL SURGERY

## 2025-01-07 PROCEDURE — 86850 RBC ANTIBODY SCREEN: CPT | Performed by: ANESTHESIOLOGY

## 2025-01-07 PROCEDURE — 84703 CHORIONIC GONADOTROPIN ASSAY: CPT | Performed by: ANESTHESIOLOGY

## 2025-01-07 PROCEDURE — 00U20JZ SUPPLEMENT DURA MATER WITH SYNTHETIC SUBSTITUTE, OPEN APPROACH: ICD-10-PCS | Performed by: NEUROLOGICAL SURGERY

## 2025-01-07 PROCEDURE — 250N000009 HC RX 250: Performed by: ANESTHESIOLOGY

## 2025-01-07 PROCEDURE — 0GB04ZZ EXCISION OF PITUITARY GLAND, PERCUTANEOUS ENDOSCOPIC APPROACH: ICD-10-PCS | Performed by: NEUROLOGICAL SURGERY

## 2025-01-07 PROCEDURE — 81003 URINALYSIS AUTO W/O SCOPE: CPT | Performed by: NURSE PRACTITIONER

## 2025-01-07 PROCEDURE — 250N000009 HC RX 250: Performed by: OTOLARYNGOLOGY

## 2025-01-07 PROCEDURE — 88331 PATH CONSLTJ SURG 1 BLK 1SPC: CPT | Mod: TC | Performed by: NEUROLOGICAL SURGERY

## 2025-01-07 PROCEDURE — C1763 CONN TISS, NON-HUMAN: HCPCS | Performed by: NEUROLOGICAL SURGERY

## 2025-01-07 PROCEDURE — 250N000025 HC SEVOFLURANE, PER MIN: Performed by: NEUROLOGICAL SURGERY

## 2025-01-07 PROCEDURE — 250N000011 HC RX IP 250 OP 636: Performed by: OTOLARYNGOLOGY

## 2025-01-07 PROCEDURE — 88331 PATH CONSLTJ SURG 1 BLK 1SPC: CPT | Mod: 26 | Performed by: PATHOLOGY

## 2025-01-07 PROCEDURE — 62165 REMOVE PITUIT TUMOR W/SCOPE: CPT | Mod: 62 | Performed by: NEUROLOGICAL SURGERY

## 2025-01-07 PROCEDURE — 250N000011 HC RX IP 250 OP 636: Performed by: NURSE PRACTITIONER

## 2025-01-07 PROCEDURE — 36415 COLL VENOUS BLD VENIPUNCTURE: CPT | Performed by: NURSE PRACTITIONER

## 2025-01-07 PROCEDURE — 272N000001 HC OR GENERAL SUPPLY STERILE: Performed by: NEUROLOGICAL SURGERY

## 2025-01-07 DEVICE — SEALANT DURA SEAL 5ML 20-2050: Type: IMPLANTABLE DEVICE | Site: NOSE | Status: FUNCTIONAL

## 2025-01-07 DEVICE — GRAFT ALLODERM 2CMX4CM MED CHARGE PER SQCM=8 141808: Type: IMPLANTABLE DEVICE | Site: NOSE | Status: FUNCTIONAL

## 2025-01-07 RX ORDER — HYDROMORPHONE HCL IN WATER/PF 6 MG/30 ML
0.4 PATIENT CONTROLLED ANALGESIA SYRINGE INTRAVENOUS EVERY 5 MIN PRN
Status: DISCONTINUED | OUTPATIENT
Start: 2025-01-07 | End: 2025-01-07 | Stop reason: HOSPADM

## 2025-01-07 RX ORDER — OXYMETAZOLINE HYDROCHLORIDE 0.05 G/100ML
SPRAY NASAL PRN
Status: DISCONTINUED | OUTPATIENT
Start: 2025-01-07 | End: 2025-01-07 | Stop reason: HOSPADM

## 2025-01-07 RX ORDER — HYDROMORPHONE HCL IN WATER/PF 6 MG/30 ML
0.2 PATIENT CONTROLLED ANALGESIA SYRINGE INTRAVENOUS
Status: DISCONTINUED | OUTPATIENT
Start: 2025-01-07 | End: 2025-01-10 | Stop reason: HOSPADM

## 2025-01-07 RX ORDER — GABAPENTIN 100 MG/1
200 CAPSULE ORAL EVERY MORNING
Status: DISCONTINUED | OUTPATIENT
Start: 2025-01-08 | End: 2025-01-10 | Stop reason: HOSPADM

## 2025-01-07 RX ORDER — HYDROCORTISONE 5 MG/1
20 TABLET ORAL DAILY
Status: DISCONTINUED | OUTPATIENT
Start: 2025-01-10 | End: 2025-01-09

## 2025-01-07 RX ORDER — OXYCODONE HYDROCHLORIDE 5 MG/1
5 TABLET ORAL EVERY 4 HOURS PRN
Status: DISCONTINUED | OUTPATIENT
Start: 2025-01-07 | End: 2025-01-10 | Stop reason: HOSPADM

## 2025-01-07 RX ORDER — NALOXONE HYDROCHLORIDE 0.4 MG/ML
0.2 INJECTION, SOLUTION INTRAMUSCULAR; INTRAVENOUS; SUBCUTANEOUS
Status: DISCONTINUED | OUTPATIENT
Start: 2025-01-07 | End: 2025-01-10 | Stop reason: HOSPADM

## 2025-01-07 RX ORDER — MAGNESIUM HYDROXIDE 1200 MG/15ML
LIQUID ORAL PRN
Status: DISCONTINUED | OUTPATIENT
Start: 2025-01-07 | End: 2025-01-07 | Stop reason: HOSPADM

## 2025-01-07 RX ORDER — HYDRALAZINE HYDROCHLORIDE 20 MG/ML
INJECTION INTRAMUSCULAR; INTRAVENOUS PRN
Status: DISCONTINUED | OUTPATIENT
Start: 2025-01-07 | End: 2025-01-07

## 2025-01-07 RX ORDER — CALCIUM CARBONATE 500 MG/1
500 TABLET, CHEWABLE ORAL 4 TIMES DAILY PRN
Status: DISCONTINUED | OUTPATIENT
Start: 2025-01-07 | End: 2025-01-07

## 2025-01-07 RX ORDER — LIDOCAINE HYDROCHLORIDE 20 MG/ML
INJECTION, SOLUTION INFILTRATION; PERINEURAL PRN
Status: DISCONTINUED | OUTPATIENT
Start: 2025-01-07 | End: 2025-01-07

## 2025-01-07 RX ORDER — ACETAMINOPHEN 325 MG/1
975 TABLET ORAL EVERY 8 HOURS
Status: DISCONTINUED | OUTPATIENT
Start: 2025-01-07 | End: 2025-01-10 | Stop reason: HOSPADM

## 2025-01-07 RX ORDER — NALOXONE HYDROCHLORIDE 0.4 MG/ML
0.1 INJECTION, SOLUTION INTRAMUSCULAR; INTRAVENOUS; SUBCUTANEOUS
Status: DISCONTINUED | OUTPATIENT
Start: 2025-01-07 | End: 2025-01-07 | Stop reason: HOSPADM

## 2025-01-07 RX ORDER — DEXTROSE MONOHYDRATE 25 G/50ML
25-50 INJECTION, SOLUTION INTRAVENOUS
Status: DISCONTINUED | OUTPATIENT
Start: 2025-01-07 | End: 2025-01-09

## 2025-01-07 RX ORDER — HYDROCORTISONE 5 MG/1
10 TABLET ORAL DAILY
Status: DISCONTINUED | OUTPATIENT
Start: 2025-01-10 | End: 2025-01-09

## 2025-01-07 RX ORDER — HYDROCORTISONE SODIUM SUCCINATE 100 MG/2ML
100 INJECTION INTRAMUSCULAR; INTRAVENOUS EVERY 8 HOURS
Status: COMPLETED | OUTPATIENT
Start: 2025-01-07 | End: 2025-01-08

## 2025-01-07 RX ORDER — ONDANSETRON 2 MG/ML
INJECTION INTRAMUSCULAR; INTRAVENOUS PRN
Status: DISCONTINUED | OUTPATIENT
Start: 2025-01-07 | End: 2025-01-07

## 2025-01-07 RX ORDER — LABETALOL 20 MG/4 ML (5 MG/ML) INTRAVENOUS SYRINGE
PRN
Status: DISCONTINUED | OUTPATIENT
Start: 2025-01-07 | End: 2025-01-07

## 2025-01-07 RX ORDER — DEXAMETHASONE SODIUM PHOSPHATE 4 MG/ML
INJECTION, SOLUTION INTRA-ARTICULAR; INTRALESIONAL; INTRAMUSCULAR; INTRAVENOUS; SOFT TISSUE PRN
Status: DISCONTINUED | OUTPATIENT
Start: 2025-01-07 | End: 2025-01-07

## 2025-01-07 RX ORDER — NALOXONE HYDROCHLORIDE 0.4 MG/ML
0.4 INJECTION, SOLUTION INTRAMUSCULAR; INTRAVENOUS; SUBCUTANEOUS
Status: DISCONTINUED | OUTPATIENT
Start: 2025-01-07 | End: 2025-01-10 | Stop reason: HOSPADM

## 2025-01-07 RX ORDER — HYDROCORTISONE SODIUM SUCCINATE 100 MG/2ML
50 INJECTION INTRAMUSCULAR; INTRAVENOUS EVERY 8 HOURS
Status: DISPENSED | OUTPATIENT
Start: 2025-01-08 | End: 2025-01-09

## 2025-01-07 RX ORDER — CEFAZOLIN SODIUM/WATER 2 G/20 ML
2 SYRINGE (ML) INTRAVENOUS SEE ADMIN INSTRUCTIONS
Status: DISCONTINUED | OUTPATIENT
Start: 2025-01-07 | End: 2025-01-07 | Stop reason: HOSPADM

## 2025-01-07 RX ORDER — ONDANSETRON 2 MG/ML
4 INJECTION INTRAMUSCULAR; INTRAVENOUS EVERY 6 HOURS PRN
Status: DISCONTINUED | OUTPATIENT
Start: 2025-01-07 | End: 2025-01-10 | Stop reason: HOSPADM

## 2025-01-07 RX ORDER — HYDROMORPHONE HCL IN WATER/PF 6 MG/30 ML
0.2 PATIENT CONTROLLED ANALGESIA SYRINGE INTRAVENOUS EVERY 5 MIN PRN
Status: DISCONTINUED | OUTPATIENT
Start: 2025-01-07 | End: 2025-01-07 | Stop reason: HOSPADM

## 2025-01-07 RX ORDER — SODIUM CHLORIDE, SODIUM LACTATE, POTASSIUM CHLORIDE, CALCIUM CHLORIDE 600; 310; 30; 20 MG/100ML; MG/100ML; MG/100ML; MG/100ML
INJECTION, SOLUTION INTRAVENOUS CONTINUOUS
Status: DISCONTINUED | OUTPATIENT
Start: 2025-01-07 | End: 2025-01-07 | Stop reason: HOSPADM

## 2025-01-07 RX ORDER — FLUTICASONE PROPIONATE 50 MCG
2 SPRAY, SUSPENSION (ML) NASAL DAILY
Status: DISCONTINUED | OUTPATIENT
Start: 2025-01-07 | End: 2025-01-07

## 2025-01-07 RX ORDER — ONDANSETRON 2 MG/ML
4 INJECTION INTRAMUSCULAR; INTRAVENOUS EVERY 30 MIN PRN
Status: DISCONTINUED | OUTPATIENT
Start: 2025-01-07 | End: 2025-01-07 | Stop reason: HOSPADM

## 2025-01-07 RX ORDER — HYDROXYZINE HYDROCHLORIDE 25 MG/1
25 TABLET, FILM COATED ORAL EVERY 6 HOURS PRN
Status: DISCONTINUED | OUTPATIENT
Start: 2025-01-07 | End: 2025-01-10 | Stop reason: HOSPADM

## 2025-01-07 RX ORDER — HYDROCORTISONE 5 MG/1
20 TABLET ORAL 2 TIMES DAILY
Status: DISCONTINUED | OUTPATIENT
Start: 2025-01-09 | End: 2025-01-09

## 2025-01-07 RX ORDER — PROPOFOL 10 MG/ML
INJECTION, EMULSION INTRAVENOUS CONTINUOUS PRN
Status: DISCONTINUED | OUTPATIENT
Start: 2025-01-07 | End: 2025-01-07

## 2025-01-07 RX ORDER — LIDOCAINE HYDROCHLORIDE 10 MG/ML
INJECTION, SOLUTION INFILTRATION; PERINEURAL
Status: COMPLETED | OUTPATIENT
Start: 2025-01-07 | End: 2025-01-07

## 2025-01-07 RX ORDER — CALCIUM CARBONATE 500 MG/1
1000 TABLET, CHEWABLE ORAL 4 TIMES DAILY PRN
Status: DISCONTINUED | OUTPATIENT
Start: 2025-01-07 | End: 2025-01-10 | Stop reason: HOSPADM

## 2025-01-07 RX ORDER — AMOXICILLIN 250 MG
1 CAPSULE ORAL 2 TIMES DAILY
Status: DISCONTINUED | OUTPATIENT
Start: 2025-01-07 | End: 2025-01-10 | Stop reason: HOSPADM

## 2025-01-07 RX ORDER — ONDANSETRON 4 MG/1
4 TABLET, ORALLY DISINTEGRATING ORAL EVERY 30 MIN PRN
Status: DISCONTINUED | OUTPATIENT
Start: 2025-01-07 | End: 2025-01-07 | Stop reason: HOSPADM

## 2025-01-07 RX ORDER — LIDOCAINE 40 MG/G
CREAM TOPICAL
Status: DISCONTINUED | OUTPATIENT
Start: 2025-01-07 | End: 2025-01-10 | Stop reason: HOSPADM

## 2025-01-07 RX ORDER — BISACODYL 10 MG
10 SUPPOSITORY, RECTAL RECTAL DAILY PRN
Status: DISCONTINUED | OUTPATIENT
Start: 2025-01-10 | End: 2025-01-10 | Stop reason: HOSPADM

## 2025-01-07 RX ORDER — HYDROMORPHONE HCL IN WATER/PF 6 MG/30 ML
0.4 PATIENT CONTROLLED ANALGESIA SYRINGE INTRAVENOUS
Status: DISCONTINUED | OUTPATIENT
Start: 2025-01-07 | End: 2025-01-10 | Stop reason: HOSPADM

## 2025-01-07 RX ORDER — CEFAZOLIN SODIUM/WATER 2 G/20 ML
2 SYRINGE (ML) INTRAVENOUS
Status: COMPLETED | OUTPATIENT
Start: 2025-01-07 | End: 2025-01-07

## 2025-01-07 RX ORDER — CEFAZOLIN SODIUM 2 G/100ML
2 INJECTION, SOLUTION INTRAVENOUS EVERY 8 HOURS
Status: COMPLETED | OUTPATIENT
Start: 2025-01-07 | End: 2025-01-08

## 2025-01-07 RX ORDER — PROCHLORPERAZINE MALEATE 10 MG
10 TABLET ORAL EVERY 6 HOURS PRN
Status: DISCONTINUED | OUTPATIENT
Start: 2025-01-07 | End: 2025-01-10 | Stop reason: HOSPADM

## 2025-01-07 RX ORDER — NICOTINE POLACRILEX 4 MG
15-30 LOZENGE BUCCAL
Status: DISCONTINUED | OUTPATIENT
Start: 2025-01-07 | End: 2025-01-09

## 2025-01-07 RX ORDER — FENTANYL CITRATE 0.05 MG/ML
25 INJECTION, SOLUTION INTRAMUSCULAR; INTRAVENOUS EVERY 5 MIN PRN
Status: DISCONTINUED | OUTPATIENT
Start: 2025-01-07 | End: 2025-01-07 | Stop reason: HOSPADM

## 2025-01-07 RX ORDER — LABETALOL HYDROCHLORIDE 5 MG/ML
20 INJECTION, SOLUTION INTRAVENOUS EVERY 4 HOURS PRN
Status: DISCONTINUED | OUTPATIENT
Start: 2025-01-07 | End: 2025-01-10 | Stop reason: HOSPADM

## 2025-01-07 RX ORDER — LEVONORGESTREL AND ETHINYL ESTRADIOL 0.15-0.03
1 KIT ORAL AT BEDTIME
Status: DISCONTINUED | OUTPATIENT
Start: 2025-01-07 | End: 2025-01-10 | Stop reason: HOSPADM

## 2025-01-07 RX ORDER — ACETAMINOPHEN 500 MG
1000 TABLET ORAL EVERY 4 HOURS PRN
Status: DISCONTINUED | OUTPATIENT
Start: 2025-01-07 | End: 2025-01-07 | Stop reason: HOSPADM

## 2025-01-07 RX ORDER — FENTANYL CITRATE 0.05 MG/ML
50 INJECTION, SOLUTION INTRAMUSCULAR; INTRAVENOUS EVERY 5 MIN PRN
Status: DISCONTINUED | OUTPATIENT
Start: 2025-01-07 | End: 2025-01-07 | Stop reason: HOSPADM

## 2025-01-07 RX ORDER — HYDRALAZINE HYDROCHLORIDE 20 MG/ML
10 INJECTION INTRAMUSCULAR; INTRAVENOUS EVERY 6 HOURS PRN
Status: DISCONTINUED | OUTPATIENT
Start: 2025-01-07 | End: 2025-01-10 | Stop reason: HOSPADM

## 2025-01-07 RX ORDER — METHOCARBAMOL 500 MG/1
500 TABLET, FILM COATED ORAL EVERY 6 HOURS PRN
Status: DISCONTINUED | OUTPATIENT
Start: 2025-01-07 | End: 2025-01-10 | Stop reason: HOSPADM

## 2025-01-07 RX ORDER — OXYCODONE HYDROCHLORIDE 5 MG/1
10 TABLET ORAL EVERY 4 HOURS PRN
Status: DISCONTINUED | OUTPATIENT
Start: 2025-01-07 | End: 2025-01-10 | Stop reason: HOSPADM

## 2025-01-07 RX ORDER — GINSENG 100 MG
CAPSULE ORAL PRN
Status: DISCONTINUED | OUTPATIENT
Start: 2025-01-07 | End: 2025-01-07 | Stop reason: HOSPADM

## 2025-01-07 RX ORDER — SODIUM CHLORIDE, SODIUM LACTATE, POTASSIUM CHLORIDE, CALCIUM CHLORIDE 600; 310; 30; 20 MG/100ML; MG/100ML; MG/100ML; MG/100ML
INJECTION, SOLUTION INTRAVENOUS CONTINUOUS PRN
Status: DISCONTINUED | OUTPATIENT
Start: 2025-01-07 | End: 2025-01-07

## 2025-01-07 RX ORDER — DEXAMETHASONE SODIUM PHOSPHATE 4 MG/ML
4 INJECTION, SOLUTION INTRA-ARTICULAR; INTRALESIONAL; INTRAMUSCULAR; INTRAVENOUS; SOFT TISSUE
Status: DISCONTINUED | OUTPATIENT
Start: 2025-01-07 | End: 2025-01-07 | Stop reason: HOSPADM

## 2025-01-07 RX ORDER — POLYETHYLENE GLYCOL 3350 17 G/17G
17 POWDER, FOR SOLUTION ORAL DAILY
Status: DISCONTINUED | OUTPATIENT
Start: 2025-01-08 | End: 2025-01-10 | Stop reason: HOSPADM

## 2025-01-07 RX ORDER — ONDANSETRON 4 MG/1
4 TABLET, ORALLY DISINTEGRATING ORAL EVERY 6 HOURS PRN
Status: DISCONTINUED | OUTPATIENT
Start: 2025-01-07 | End: 2025-01-10 | Stop reason: HOSPADM

## 2025-01-07 RX ORDER — LIDOCAINE HYDROCHLORIDE AND EPINEPHRINE 10; 10 MG/ML; UG/ML
INJECTION, SOLUTION INFILTRATION; PERINEURAL PRN
Status: DISCONTINUED | OUTPATIENT
Start: 2025-01-07 | End: 2025-01-07 | Stop reason: HOSPADM

## 2025-01-07 RX ORDER — DEXMEDETOMIDINE HYDROCHLORIDE 4 UG/ML
INJECTION, SOLUTION INTRAVENOUS PRN
Status: DISCONTINUED | OUTPATIENT
Start: 2025-01-07 | End: 2025-01-07

## 2025-01-07 RX ORDER — HYDROCORTISONE 10 MG/1
40 TABLET ORAL 2 TIMES DAILY
Status: DISCONTINUED | OUTPATIENT
Start: 2025-01-09 | End: 2025-01-09

## 2025-01-07 RX ORDER — GABAPENTIN 300 MG/1
600 CAPSULE ORAL AT BEDTIME
Status: DISCONTINUED | OUTPATIENT
Start: 2025-01-07 | End: 2025-01-10 | Stop reason: HOSPADM

## 2025-01-07 RX ORDER — FENTANYL CITRATE 0.05 MG/ML
50 INJECTION, SOLUTION INTRAMUSCULAR; INTRAVENOUS
Status: COMPLETED | OUTPATIENT
Start: 2025-01-07 | End: 2025-01-07

## 2025-01-07 RX ADMIN — ACETAMINOPHEN 1000 MG: 500 TABLET, FILM COATED ORAL at 11:43

## 2025-01-07 RX ADMIN — PROPOFOL 200 MCG/KG/MIN: 10 INJECTION, EMULSION INTRAVENOUS at 08:47

## 2025-01-07 RX ADMIN — FENTANYL CITRATE 25 MCG: 50 INJECTION, SOLUTION INTRAMUSCULAR; INTRAVENOUS at 11:08

## 2025-01-07 RX ADMIN — HYDROMORPHONE HYDROCHLORIDE 0.5 MG: 1 INJECTION, SOLUTION INTRAMUSCULAR; INTRAVENOUS; SUBCUTANEOUS at 10:26

## 2025-01-07 RX ADMIN — CEFAZOLIN SODIUM 2 G: 2 INJECTION, SOLUTION INTRAVENOUS at 16:23

## 2025-01-07 RX ADMIN — HYDRALAZINE HYDROCHLORIDE 5 MG: 20 INJECTION INTRAMUSCULAR; INTRAVENOUS at 09:36

## 2025-01-07 RX ADMIN — PROPOFOL 150 MG: 10 INJECTION, EMULSION INTRAVENOUS at 08:14

## 2025-01-07 RX ADMIN — HYDROMORPHONE HYDROCHLORIDE 0.2 MG: 0.2 INJECTION, SOLUTION INTRAMUSCULAR; INTRAVENOUS; SUBCUTANEOUS at 12:30

## 2025-01-07 RX ADMIN — ONDANSETRON 4 MG: 2 INJECTION INTRAMUSCULAR; INTRAVENOUS at 10:25

## 2025-01-07 RX ADMIN — DEXMEDETOMIDINE HYDROCHLORIDE 8 MCG: 200 INJECTION INTRAVENOUS at 09:00

## 2025-01-07 RX ADMIN — ONDANSETRON 4 MG: 2 INJECTION, SOLUTION INTRAMUSCULAR; INTRAVENOUS at 13:46

## 2025-01-07 RX ADMIN — SODIUM CHLORIDE, POTASSIUM CHLORIDE, SODIUM LACTATE AND CALCIUM CHLORIDE: 600; 310; 30; 20 INJECTION, SOLUTION INTRAVENOUS at 06:22

## 2025-01-07 RX ADMIN — LABETALOL 20 MG/4 ML (5 MG/ML) INTRAVENOUS SYRINGE 10 MG: at 09:18

## 2025-01-07 RX ADMIN — NICARDIPINE HYDROCHLORIDE 5 MG/HR: 0.2 INJECTION INTRAVENOUS at 10:10

## 2025-01-07 RX ADMIN — DEXMEDETOMIDINE HYDROCHLORIDE 20 MCG: 200 INJECTION INTRAVENOUS at 09:15

## 2025-01-07 RX ADMIN — LIDOCAINE HYDROCHLORIDE 100 MG: 20 INJECTION, SOLUTION INFILTRATION; PERINEURAL at 07:50

## 2025-01-07 RX ADMIN — HYDROMORPHONE HYDROCHLORIDE 0.2 MG: 0.2 INJECTION, SOLUTION INTRAMUSCULAR; INTRAVENOUS; SUBCUTANEOUS at 12:05

## 2025-01-07 RX ADMIN — HYDROMORPHONE HYDROCHLORIDE 0.2 MG: 0.2 INJECTION, SOLUTION INTRAMUSCULAR; INTRAVENOUS; SUBCUTANEOUS at 11:44

## 2025-01-07 RX ADMIN — LABETALOL 20 MG/4 ML (5 MG/ML) INTRAVENOUS SYRINGE 5 MG: at 09:21

## 2025-01-07 RX ADMIN — OXYCODONE HYDROCHLORIDE 5 MG: 5 TABLET ORAL at 22:09

## 2025-01-07 RX ADMIN — HYDROMORPHONE HYDROCHLORIDE 0.2 MG: 0.2 INJECTION, SOLUTION INTRAMUSCULAR; INTRAVENOUS; SUBCUTANEOUS at 11:25

## 2025-01-07 RX ADMIN — HYDROCORTISONE SODIUM SUCCINATE 100 MG: 100 INJECTION, POWDER, FOR SOLUTION INTRAMUSCULAR; INTRAVENOUS at 16:22

## 2025-01-07 RX ADMIN — Medication 200 MG: at 10:33

## 2025-01-07 RX ADMIN — ROCURONIUM BROMIDE 30 MG: 50 INJECTION, SOLUTION INTRAVENOUS at 09:52

## 2025-01-07 RX ADMIN — ROCURONIUM BROMIDE 30 MG: 50 INJECTION, SOLUTION INTRAVENOUS at 08:53

## 2025-01-07 RX ADMIN — ONDANSETRON 4 MG: 2 INJECTION, SOLUTION INTRAMUSCULAR; INTRAVENOUS at 17:05

## 2025-01-07 RX ADMIN — FENTANYL CITRATE 50 MCG: 50 INJECTION, SOLUTION INTRAMUSCULAR; INTRAVENOUS at 09:30

## 2025-01-07 RX ADMIN — PROPOFOL 200 MG: 10 INJECTION, EMULSION INTRAVENOUS at 07:50

## 2025-01-07 RX ADMIN — PROCHLORPERAZINE EDISYLATE 5 MG: 5 INJECTION INTRAMUSCULAR; INTRAVENOUS at 14:30

## 2025-01-07 RX ADMIN — ACETAMINOPHEN 975 MG: 325 TABLET, FILM COATED ORAL at 20:07

## 2025-01-07 RX ADMIN — MIDAZOLAM 2 MG: 1 INJECTION INTRAMUSCULAR; INTRAVENOUS at 07:00

## 2025-01-07 RX ADMIN — FENTANYL CITRATE 50 MCG: 50 INJECTION, SOLUTION INTRAMUSCULAR; INTRAVENOUS at 07:50

## 2025-01-07 RX ADMIN — PROPOFOL 200 MCG/KG/MIN: 10 INJECTION, EMULSION INTRAVENOUS at 09:47

## 2025-01-07 RX ADMIN — FENTANYL CITRATE 25 MCG: 50 INJECTION, SOLUTION INTRAMUSCULAR; INTRAVENOUS at 11:03

## 2025-01-07 RX ADMIN — MIDAZOLAM 2 MG: 1 INJECTION INTRAMUSCULAR; INTRAVENOUS at 07:50

## 2025-01-07 RX ADMIN — GABAPENTIN 600 MG: 300 CAPSULE ORAL at 22:06

## 2025-01-07 RX ADMIN — DEXAMETHASONE SODIUM PHOSPHATE 10 MG: 4 INJECTION, SOLUTION INTRA-ARTICULAR; INTRALESIONAL; INTRAMUSCULAR; INTRAVENOUS; SOFT TISSUE at 08:07

## 2025-01-07 RX ADMIN — Medication 1 LOZENGE: at 12:39

## 2025-01-07 RX ADMIN — LIDOCAINE HYDROCHLORIDE 2 ML: 10 INJECTION, SOLUTION INFILTRATION; PERINEURAL at 07:37

## 2025-01-07 RX ADMIN — SODIUM CHLORIDE, POTASSIUM CHLORIDE, SODIUM LACTATE AND CALCIUM CHLORIDE: 600; 310; 30; 20 INJECTION, SOLUTION INTRAVENOUS at 08:00

## 2025-01-07 RX ADMIN — Medication 2 G: at 07:59

## 2025-01-07 RX ADMIN — SODIUM CHLORIDE 3 UNITS: 9 INJECTION, SOLUTION INTRAVENOUS at 15:11

## 2025-01-07 RX ADMIN — DEXMEDETOMIDINE HYDROCHLORIDE 12 MCG: 200 INJECTION INTRAVENOUS at 09:07

## 2025-01-07 RX ADMIN — ROCURONIUM BROMIDE 70 MG: 50 INJECTION, SOLUTION INTRAVENOUS at 07:51

## 2025-01-07 RX ADMIN — PROPOFOL 150 MCG/KG/MIN: 10 INJECTION, EMULSION INTRAVENOUS at 08:00

## 2025-01-07 RX ADMIN — DEXMEDETOMIDINE HYDROCHLORIDE 0.4 MCG/KG/HR: 100 INJECTION, SOLUTION INTRAVENOUS at 09:27

## 2025-01-07 RX ADMIN — REMIFENTANIL HYDROCHLORIDE 0.1 MCG/KG/MIN: 1 INJECTION, POWDER, LYOPHILIZED, FOR SOLUTION INTRAVENOUS at 08:00

## 2025-01-07 RX ADMIN — LEVONORGESTREL AND ETHINYL ESTRADIOL 1 TABLET: KIT at 22:06

## 2025-01-07 RX ADMIN — SODIUM CHLORIDE, POTASSIUM CHLORIDE, SODIUM LACTATE AND CALCIUM CHLORIDE: 600; 310; 30; 20 INJECTION, SOLUTION INTRAVENOUS at 09:47

## 2025-01-07 RX ADMIN — HYDROMORPHONE HYDROCHLORIDE 0.4 MG: 0.2 INJECTION, SOLUTION INTRAMUSCULAR; INTRAVENOUS; SUBCUTANEOUS at 11:15

## 2025-01-07 ASSESSMENT — ACTIVITIES OF DAILY LIVING (ADL)
ADLS_ACUITY_SCORE: 15
ADLS_ACUITY_SCORE: 17
ADLS_ACUITY_SCORE: 29
ADLS_ACUITY_SCORE: 24
ADLS_ACUITY_SCORE: 15
ADLS_ACUITY_SCORE: 41
ADLS_ACUITY_SCORE: 17
ADLS_ACUITY_SCORE: 24
ADLS_ACUITY_SCORE: 17
ADLS_ACUITY_SCORE: 24
ADLS_ACUITY_SCORE: 15
ADLS_ACUITY_SCORE: 15
ADLS_ACUITY_SCORE: 24
ADLS_ACUITY_SCORE: 15

## 2025-01-07 NOTE — CONSULTS
SPIRITUAL HEALTH SERVICES  SPIRITUAL ASSESSMENT Consult Note  [facility] Surgery     REFERRAL SOURCE: Call from pre-op    Met with patient's mother in the meditation sanctuary per request for Spiritual Health support. I offered spiritual and emotional support through reflective listening that affirmed emotions, experience, and meaning. Offered assurance through prayer which incorporated conversational themes and reading from José 43.    PLAN: Spiritual Health remains available for support. Please consult as needs arise or as requested by patient/loved ones.    Shira Rodriguez  Associate      [facility] available 24/7 for emergent requests/referrals, either by paging the on-call  or by entering an ASAP/STAT consult in Epic (this will also page the on-call ).      (0) independent

## 2025-01-07 NOTE — OP NOTE
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  Stealth guidance    2.  Posterior septectomy    3.  Bilateral lateralization of middle and inferior turbinates    4.  Bilateral sphenoidotomy with removal of contents    5.  Resection of cell tumor per neurosurgery    6.  Repair of dural defect with AlloDerm    7.  Repair skull base defect with bilateral middle turbinate flaps    Surgeon: Wilmar Dillon MD, DDS    Co-surgeon: Oscar Ybarra MD    Assistant: Lorin Burleson NP-neurosurgery    Anesthesia: General By endotracheal intubation    Estimated blood loss: 20 cc    Operative indications and consent: This is a 34-year-old female whose had headaches but no visual changes to speak of and on radiographic finding found to have a suprasellar extended mass.  Thus understanding the risk and benefits of the procedure she has signed consent accordingly.    Operative details: Patient was brought to the operating room and placed supine on the operating table with general anesthesia by endotracheal intubation was induced.  Timeout was called and all surgical sites were identified.  At this time the patient was placed in the Ruelas head frame and calibrated to within 0.6 mm error.    The nose was then prepped by injecting approximately 8 cc of 1% Xylocaine with 1-100,000 epinephrine into the posterior septum lateral nasal walls and the base of the sphenoid.  Topical oxymetazoline was placed as well.    Using a 0 degree endoscope both nares were entered with lateralization of the middle and inferior turbinates.  A posterior septectomy was then undertaking using suction cautery and a backbiting forceps.  Sphenoid rostrum was identified as well as the sphenoid ostia themselves.  Patient then underwent bilateral sphenoidotomy with removal of the rostrum as well as the superficial tissues  of the sphenoid cavity itself.  This was widened with a [place] elevator in addition to blunt and sharp dissection of the remaining bony orifice.    In order to help with the exposure to expandable Spiway nasal stents were placed.    Dr. Ybarra then entered the case and proceeded with resection of the sella mass.  Bleeding was controlled with Surgiflo hemostatic agent as well as suction cautery to the periphery.    With the dural defect present was decided to place a Gelfoam pledget to help bleeding within the cavity of the sella itself followed by a layered technique of inlay AlloDerm and outlay AlloDerm to the opening.  This was sealed with DuraSeal tissue glue.  Additional Surgiflo was used for hemostasis as well as placement of NasoPore dressings within the sphenoid vault itself.    The posterior aspect of the nares were suctioned clear and then for reconstruction of the septal defect bilateral nasal turbinate flaps were rotated into the field.  These were secured with additional NasoPore dressing.    To bolster this area bilateral Patel splints were placed laced with bacitracin to again help medialized the middle turbinates and protect the anterior septum.  This was secured to the caudal septum with a 3-0 nylon.    Finally the posterior pharynx was suctioned clear stomach contents aspirated and procedure completed.  Must be noted patient did receive perioperative antibiotics.    The Ruelas head frame was then removed and the patient was returned to anesthesia where she was fully awakened extubated and taken to recovery room in the ICU in stable condition.

## 2025-01-07 NOTE — ANESTHESIA PROCEDURE NOTES
Arterial Line Procedure Note    Pre-Procedure   Staff -        Anesthesiologist:  Jason Vance MD       Performed By: anesthesiologist       Location: pre-op       Pre-Anesthestic Checklist: patient identified, IV checked, risks and benefits discussed, informed consent and pre-op evaluation  Timeout:       Correct Patient: Yes        Correct Procedure: Yes        Correct Site: Yes        Correct Position: Yes   Line Placement:   This line was placed Pre Induction starting at 1/7/2025 7:15 AM and ending at 1/7/2025 7:15 AM  Procedure   Procedure: arterial line       Laterality: right       Insertion Site: radial.  Sterile Prep        Standard elements of sterile barrier followed       Skin prep: Chloraprep  Insertion/Injection        Technique: ultrasound guided        1. Ultrasound was used to evaluate the access site.       2. Artery evaluated via ultrasound for patency/adequacy.       3. Using real-time ultrasound the needle/catheter was observed entering the artery/vein.       4. Permanent image was captured and entered into the patient's record.       5. The visualized structures were anatomically normal.       6. There were no apparent abnormal pathologic findings.       Catheter Type/Size: 20 gauge, 1.75 in/4.5 cm quick cath (integral wire)  Narrative         Secured by: other       Tegaderm dressing used.       Complications: None apparent,    Comments:  Ultrasound used for placement particularly given obesity.    Attempted left radial x 2 and catheter would not thread.  Switched to right radial and successful.    Secured with adhesive spray, tegaderm, and tape

## 2025-01-07 NOTE — ANESTHESIA PROCEDURE NOTES
Airway       Patient location during procedure: OR       Procedure Start/Stop Times: 1/7/2025 7:54 AM  Staff -        Anesthesiologist:  Jason Vance MD       CRNA: Jay Vickers APRN CRNA       Other Anesthesia Staff: Noreen Castillo       Performed By: KASEY and with CRNAs       Procedure performed by resident/fellow/CRNA in presence of a teaching physician.    Consent for Airway        Urgency: elective  Indications and Patient Condition       Indications for airway management: fahad-procedural       Induction type:intravenous       Mask difficulty assessment: 2 - vent by mask + OA or adjuvant +/- NMBA    Final Airway Details       Final airway type: endotracheal airway       Successful airway: ETT - single and Oral  Endotracheal Airway Details        ETT size (mm): 7.0       Cuffed: yes       Successful intubation technique: video laryngoscopy       VL Blade Size: Glidescope 3       Grade View of Cords: 2       Adjucts: stylet       Position: Right       Measured from: gums/teeth       Secured at (cm): 22       Bite block used: None    Post intubation assessment        Placement verified by: capnometry, equal breath sounds and chest rise        Number of attempts at approach: 1       Number of other approaches attempted: 0       Secured with: tape       Ease of procedure: easy       Dentition: Unchanged and Intact    Medication(s) Administered   Medication Administration Time: 1/7/2025 7:54 AM

## 2025-01-07 NOTE — PLAN OF CARE
"  Problem: Adult Inpatient Plan of Care  Goal: Plan of Care Review  Description: The Plan of Care Review/Shift note should be completed every shift.  The Outcome Evaluation is a brief statement about your assessment that the patient is improving, declining, or no change.  This information will be displayed automatically on your shift  note.  Outcome: Progressing  Flowsheets (Taken 1/7/2025 1722)  Plan of Care Reviewed With:   patient   spouse   family  Overall Patient Progress: no change  Goal: Patient-Specific Goal (Individualized)  Description: You can add care plan individualizations to a care plan. Examples of Individualization might be:  \"Parent requests to be called daily at 9am for status\", \"I have a hard time hearing out of my right ear\", or \"Do not touch me to wake me up as it startles  me\".  Outcome: Progressing  Goal: Absence of Hospital-Acquired Illness or Injury  Outcome: Progressing  Intervention: Identify and Manage Fall Risk  Recent Flowsheet Documentation  Taken 1/7/2025 1600 by Venessa Cazares RN  Safety Promotion/Fall Prevention:   activity supervised   clutter free environment maintained   patient and family education   treat reversible contributory factors   safety round/check completed  Intervention: Prevent Skin Injury  Recent Flowsheet Documentation  Taken 1/7/2025 1600 by Venessa Cazares RN  Body Position: weight shifting  Intervention: Prevent and Manage VTE (Venous Thromboembolism) Risk  Recent Flowsheet Documentation  Taken 1/7/2025 1600 by Venessa Cazares RN  VTE Prevention/Management: SCDs on (sequential compression devices)  Goal: Optimal Comfort and Wellbeing  Outcome: Progressing  Intervention: Provide Person-Centered Care  Recent Flowsheet Documentation  Taken 1/7/2025 1700 by Venessa Cazares RN  Trust Relationship/Rapport:   care explained   choices provided   questions encouraged   reassurance provided  Taken 1/7/2025 1600 by Venessa Cazares RN  Trust Relationship/Rapport:   care " explained   choices provided   reassurance provided   questions answered   emotional support provided   empathic listening provided   thoughts/feelings acknowledged  Goal: Readiness for Transition of Care  Outcome: Progressing   Goal Outcome Evaluation:     Plan of Care Reviewed With: patient, spouse, family    Overall Patient Progress: no changeOverall Patient Progress: no change    A/O x4. Neuros intact. Tele NSR. R radial arterial line. PIV x3. Bilateral nares, WDL. Tolerating clears. Zofran x1. Rangel in place, adequate output. Nasal precautions maintained. Spouse and family updated at bedside.

## 2025-01-07 NOTE — OP NOTE
DATE OF PROCEDURE:  January 7, 2025      SURGEON:  Oscar Ybarra MD   CO-SURGEON: Wilmar Ospina MD  ASSISTANT:  Lorin Burleson NP  Note: Dr. Ospina's role as co-surgeon was crucial for nasal exposure, handling of the endoscope during surgery, and closure  Note: Lorin Burleson was present for and assisted with the entire surgery, and his/her role as an assistant was crucial for aid in positioning, suctioning, direction of the camera, and closure.       PREOPERATIVE DIAGNOSIS:  Suprasellar mass with optic chiasm compression.       POSTOPERATIVE DIAGNOSIS:  Suprasellar mass with optic chiasm compression.       PROCEDURES:   1.  Endoscopic endonasal transsphenoidal and transcavernous resection of suprasellar mass.   2.  Use of [employer] Stealth navigation with MRI and CT guidance.       ESTIMATED BLOOD LOSS:  100 mL.       INDICATIONS: 34 year old female developed headaches and work-up showed a large suprasellar mass with compression of the optic chiasm and cavernous sinus invasion.  We discussed that given the size of the mass and compression of the chiasm, that surgical resection was a consideration.  Risks, benefits, indications and alternatives were discussed with the patient and family in detail.  All of their questions were answered, and they wished to proceed with surgery.       DESCRIPTION OF PROCEDURE:  The patient was positioned supine in pins.  Our sterile prepping and draping procedures were performed.  [employer] Stealth navigation was registered with CT and MR guidance.  Antibiotics were administered and timeout was performed.  Please refer to Dr. Ospina's separately dictated note regarding nasal and sinus exposure.  Once the sphenoid sinus was entered, the pituitary rongeurs were then used to remove the sphenoid septum, and the Blakesley forceps were used to remove the mucosa of the sphenoid sinus.  In this manner, the sella was exposed.  Navigation was used to identify the borders of the sella  and to identify critical structures, including the carotid arteries and optic nerves.  The pancake and drill wer used to remove the bone of the sella, and the retractable knife was used to open the dura of the sella.  Tumor was expressed with the ring forceps, and both frozen and permanent sections were sent to pathology.  Using a combination of a ring forceps and suction the mass was removed, and the diaphragm sella descended down into view of the surgical field.  The tumor had degraded the medial wall of the cavernous sinus, and tumor was removed superior and medial to the cavernous carotid.  The normal pituitary gland was visualized as well.  Hemostasis was achieved with Surgiflo and Gelfoam.  The dural defect was covered with alloderm, and fixed into place with DuraSeal glue.  Final hemostasis was achieved.  There were no intraprocedural complications.

## 2025-01-07 NOTE — ANESTHESIA CARE TRANSFER NOTE
Patient: Sofia Hdz    Procedure: Procedure(s):  Endoscopic endonasal resection of pituitary mass with stealth       Diagnosis: Suprasellar mass [G93.89]  Diagnosis Additional Information: No value filed.    Anesthesia Type:   General     Note:    Oropharynx: oropharynx clear of all foreign objects and spontaneously breathing  Level of Consciousness: awake  Oxygen Supplementation: face mask  Level of Supplemental Oxygen (L/min / FiO2): 6  Independent Airway: airway patency satisfactory and stable  Dentition: dentition unchanged  Vital Signs Stable: post-procedure vital signs reviewed and stable  Report to RN Given: handoff report given  Patient transferred to: PACU    Handoff Report: Identifed the Patient, Identified the Reponsible Provider, Reviewed the pertinent medical history, Discussed the surgical course, Reviewed Intra-OP anesthesia mangement and issues during anesthesia, Set expectations for post-procedure period and Allowed opportunity for questions and acknowledgement of understanding  Vitals:  Vitals Value Taken Time   /75 01/07/25 1049   Temp     Pulse 97 01/07/25 1054   Resp 7 01/07/25 1054   SpO2 95 % 01/07/25 1054   Vitals shown include unfiled device data.    Electronically Signed By: MAR Motta CRNA  January 7, 2025  10:56 AM

## 2025-01-07 NOTE — ANESTHESIA POSTPROCEDURE EVALUATION
Patient: Sofia Hdz    Procedure: Procedure(s):  Endoscopic endonasal resection of pituitary mass with stealth       Anesthesia Type:  General    Note:     Postop Pain Control: Uneventful            Sign Out: Well controlled pain   PONV: No   Neuro/Psych: Uneventful            Sign Out: Acceptable/Baseline neuro status   Airway/Respiratory: Uneventful            Sign Out: Acceptable/Baseline resp. status   CV/Hemodynamics: Uneventful            Sign Out: Acceptable CV status; No obvious hypovolemia; No obvious fluid overload   Other NRE: NONE   DID A NON-ROUTINE EVENT OCCUR? No           Last vitals:  Vitals Value Taken Time   /81 01/07/25 1215   Temp     Pulse 90 01/07/25 1238   Resp 15 01/07/25 1238   SpO2 93 % 01/07/25 1238   Vitals shown include unfiled device data.    Electronically Signed By: Jason Vance MD  January 7, 2025  12:40 PM

## 2025-01-07 NOTE — BRIEF OP NOTE
[facility] Brief Operative Note    Pre-operative diagnosis: Suprasellar mass [G93.89]   Post-operative diagnosis As above   Procedure: Procedure(s):  Endoscopic endonasal resection of pituitary mass with stealth   Surgeon(s): Surgeons and Role:     * Oscar Ybarra MD - Primary     * Wilmar Ospina MD - Assisting   Estimated blood loss: 25 mL    Specimens: ID Type Source Tests Collected by Time Destination   1 : SUPRASELLAR MASS Tissue Brain SURGICAL PATHOLOGY EXAM Oscar Ybarra MD 1/7/2025  9:45 AM       Findings: See op note

## 2025-01-07 NOTE — PROGRESS NOTES
Brief Neurosurgery Post Op Note:    POD0 s/p endoscopic endonasal resection of pituitary mass with stealth with Dr. Ybarra    Plan:   -ICU overnight  -MRI brain and endocrine lab panel on POD1   -Sodium and urine specific gravity q 6 hours  -Hydrocortisone taper x 1 week  -Antibiotics x 3 doses   -Nasal precautions  -Neuro checks/vitals as ordered   -Nasal saline spray at discharge. Okay to use up to 4 times per day.   -Nasal splints to remain in place x 3 weeks  -Follow up with Dr. Ospina at 3 weeks post op  -Follow up with Dr. Ybarra's team at 6 weeks and 12 weeks post op     Lorin Burleson, [facility] Neurosurgery  [address] [place] [id number]  Tel [phone]  Fax [phone]  Text page via [facility] Paging/Directory

## 2025-01-08 ENCOUNTER — APPOINTMENT (OUTPATIENT)
Dept: MRI IMAGING | Facility: CLINIC | Age: 35
End: 2025-01-08
Attending: NURSE PRACTITIONER
Payer: COMMERCIAL

## 2025-01-08 ENCOUNTER — APPOINTMENT (OUTPATIENT)
Dept: PHYSICAL THERAPY | Facility: CLINIC | Age: 35
End: 2025-01-08
Attending: NURSE PRACTITIONER
Payer: COMMERCIAL

## 2025-01-08 LAB
CORTIS SERPL-MCNC: 65.7 UG/DL
FSH SERPL IRP2-ACNC: 0.8 MIU/ML
GLUCOSE BLDC GLUCOMTR-MCNC: 184 MG/DL (ref 70–99)
GLUCOSE BLDC GLUCOMTR-MCNC: 215 MG/DL (ref 70–99)
GLUCOSE BLDC GLUCOMTR-MCNC: 288 MG/DL (ref 70–99)
GLUCOSE BLDC GLUCOMTR-MCNC: 324 MG/DL (ref 70–99)
GLUCOSE BLDC GLUCOMTR-MCNC: 398 MG/DL (ref 70–99)
LH SERPL-ACNC: <0.3 MIU/ML
PROLACTIN SERPL 3RD IS-MCNC: 25 NG/ML (ref 5–23)
SHBG SERPL-SCNC: 34 NMOL/L (ref 30–135)
SODIUM SERPL-SCNC: 137 MMOL/L (ref 135–145)
SODIUM SERPL-SCNC: 139 MMOL/L (ref 135–145)
SP GR UR STRIP: 1.01 (ref 1–1.03)
SP GR UR STRIP: 1.01 (ref 1–1.03)
SP GR UR STRIP: 1.02 (ref 1–1.03)
SP GR UR STRIP: 1.02 (ref 1–1.03)
T4 SERPL-MCNC: 11.8 UG/DL (ref 4.5–11.7)
TSH SERPL DL<=0.005 MIU/L-ACNC: 2 UIU/ML (ref 0.3–4.2)

## 2025-01-08 PROCEDURE — A9585 GADOBUTROL INJECTION: HCPCS | Performed by: NURSE PRACTITIONER

## 2025-01-08 PROCEDURE — 82530 CORTISOL FREE: CPT | Performed by: NURSE PRACTITIONER

## 2025-01-08 PROCEDURE — 83003 ASSAY GROWTH HORMONE (HGH): CPT | Performed by: NURSE PRACTITIONER

## 2025-01-08 PROCEDURE — 81003 URINALYSIS AUTO W/O SCOPE: CPT | Performed by: NURSE PRACTITIONER

## 2025-01-08 PROCEDURE — 84146 ASSAY OF PROLACTIN: CPT | Performed by: NURSE PRACTITIONER

## 2025-01-08 PROCEDURE — 250N000012 HC RX MED GY IP 250 OP 636 PS 637: Performed by: PHYSICIAN ASSISTANT

## 2025-01-08 PROCEDURE — 84436 ASSAY OF TOTAL THYROXINE: CPT | Performed by: NURSE PRACTITIONER

## 2025-01-08 PROCEDURE — 83002 ASSAY OF GONADOTROPIN (LH): CPT | Performed by: NURSE PRACTITIONER

## 2025-01-08 PROCEDURE — 83001 ASSAY OF GONADOTROPIN (FSH): CPT | Performed by: NURSE PRACTITIONER

## 2025-01-08 PROCEDURE — 84305 ASSAY OF SOMATOMEDIN: CPT | Performed by: NURSE PRACTITIONER

## 2025-01-08 PROCEDURE — 82533 TOTAL CORTISOL: CPT | Performed by: NURSE PRACTITIONER

## 2025-01-08 PROCEDURE — 250N000013 HC RX MED GY IP 250 OP 250 PS 637: Performed by: NURSE PRACTITIONER

## 2025-01-08 PROCEDURE — 250N000011 HC RX IP 250 OP 636: Performed by: PHYSICIAN ASSISTANT

## 2025-01-08 PROCEDURE — 84270 ASSAY OF SEX HORMONE GLOBUL: CPT | Performed by: NURSE PRACTITIONER

## 2025-01-08 PROCEDURE — 84443 ASSAY THYROID STIM HORMONE: CPT | Performed by: NURSE PRACTITIONER

## 2025-01-08 PROCEDURE — 84295 ASSAY OF SERUM SODIUM: CPT | Performed by: NURSE PRACTITIONER

## 2025-01-08 PROCEDURE — 97110 THERAPEUTIC EXERCISES: CPT | Mod: GP | Performed by: PHYSICAL THERAPIST

## 2025-01-08 PROCEDURE — 84403 ASSAY OF TOTAL TESTOSTERONE: CPT | Performed by: NURSE PRACTITIONER

## 2025-01-08 PROCEDURE — 97530 THERAPEUTIC ACTIVITIES: CPT | Mod: GP | Performed by: PHYSICAL THERAPIST

## 2025-01-08 PROCEDURE — 36415 COLL VENOUS BLD VENIPUNCTURE: CPT | Performed by: NURSE PRACTITIONER

## 2025-01-08 PROCEDURE — 250N000011 HC RX IP 250 OP 636: Mod: JZ | Performed by: NURSE PRACTITIONER

## 2025-01-08 PROCEDURE — 120N000013 HC R&B IMCU

## 2025-01-08 PROCEDURE — 97161 PT EVAL LOW COMPLEX 20 MIN: CPT | Mod: GP | Performed by: PHYSICAL THERAPIST

## 2025-01-08 PROCEDURE — 70553 MRI BRAIN STEM W/O & W/DYE: CPT

## 2025-01-08 PROCEDURE — 255N000002 HC RX 255 OP 636: Performed by: NURSE PRACTITIONER

## 2025-01-08 RX ORDER — GADOBUTROL 604.72 MG/ML
9 INJECTION INTRAVENOUS ONCE
Status: COMPLETED | OUTPATIENT
Start: 2025-01-08 | End: 2025-01-08

## 2025-01-08 RX ADMIN — OXYCODONE HYDROCHLORIDE 10 MG: 5 TABLET ORAL at 19:01

## 2025-01-08 RX ADMIN — GADOBUTROL 9 ML: 604.72 INJECTION INTRAVENOUS at 04:02

## 2025-01-08 RX ADMIN — OXYCODONE HYDROCHLORIDE 10 MG: 5 TABLET ORAL at 23:31

## 2025-01-08 RX ADMIN — LABETALOL HYDROCHLORIDE 20 MG: 5 INJECTION INTRAVENOUS at 18:39

## 2025-01-08 RX ADMIN — ACETAMINOPHEN 975 MG: 325 TABLET, FILM COATED ORAL at 04:34

## 2025-01-08 RX ADMIN — INSULIN ASPART 4 UNITS: 100 INJECTION, SOLUTION INTRAVENOUS; SUBCUTANEOUS at 17:41

## 2025-01-08 RX ADMIN — INSULIN ASPART 3 UNITS: 100 INJECTION, SOLUTION INTRAVENOUS; SUBCUTANEOUS at 11:08

## 2025-01-08 RX ADMIN — OXYCODONE HYDROCHLORIDE 10 MG: 5 TABLET ORAL at 10:31

## 2025-01-08 RX ADMIN — OXYCODONE HYDROCHLORIDE 10 MG: 5 TABLET ORAL at 02:13

## 2025-01-08 RX ADMIN — ACETAMINOPHEN 975 MG: 325 TABLET, FILM COATED ORAL at 21:03

## 2025-01-08 RX ADMIN — OXYCODONE HYDROCHLORIDE 5 MG: 5 TABLET ORAL at 06:30

## 2025-01-08 RX ADMIN — HYDROCORTISONE SODIUM SUCCINATE 50 MG: 100 INJECTION, POWDER, FOR SOLUTION INTRAMUSCULAR; INTRAVENOUS at 15:28

## 2025-01-08 RX ADMIN — CEFAZOLIN SODIUM 2 G: 2 INJECTION, SOLUTION INTRAVENOUS at 08:48

## 2025-01-08 RX ADMIN — OXYCODONE HYDROCHLORIDE 10 MG: 5 TABLET ORAL at 15:00

## 2025-01-08 RX ADMIN — HYDROCORTISONE SODIUM SUCCINATE 100 MG: 100 INJECTION, POWDER, FOR SOLUTION INTRAMUSCULAR; INTRAVENOUS at 08:48

## 2025-01-08 RX ADMIN — ACETAMINOPHEN 975 MG: 325 TABLET, FILM COATED ORAL at 11:09

## 2025-01-08 RX ADMIN — GABAPENTIN 200 MG: 100 CAPSULE ORAL at 08:06

## 2025-01-08 RX ADMIN — ONDANSETRON 4 MG: 2 INJECTION, SOLUTION INTRAMUSCULAR; INTRAVENOUS at 10:33

## 2025-01-08 RX ADMIN — HYDROCORTISONE SODIUM SUCCINATE 100 MG: 100 INJECTION, POWDER, FOR SOLUTION INTRAMUSCULAR; INTRAVENOUS at 00:33

## 2025-01-08 RX ADMIN — LABETALOL HYDROCHLORIDE 20 MG: 5 INJECTION INTRAVENOUS at 02:18

## 2025-01-08 RX ADMIN — ONDANSETRON 4 MG: 4 TABLET, ORALLY DISINTEGRATING ORAL at 03:15

## 2025-01-08 RX ADMIN — METHOCARBAMOL 500 MG: 500 TABLET ORAL at 08:48

## 2025-01-08 RX ADMIN — CEFAZOLIN SODIUM 2 G: 2 INJECTION, SOLUTION INTRAVENOUS at 00:33

## 2025-01-08 RX ADMIN — LEVONORGESTREL AND ETHINYL ESTRADIOL 1 TABLET: KIT at 21:33

## 2025-01-08 RX ADMIN — GABAPENTIN 600 MG: 300 CAPSULE ORAL at 21:33

## 2025-01-08 RX ADMIN — INSULIN ASPART 2 UNITS: 100 INJECTION, SOLUTION INTRAVENOUS; SUBCUTANEOUS at 08:04

## 2025-01-08 ASSESSMENT — ACTIVITIES OF DAILY LIVING (ADL)
ADLS_ACUITY_SCORE: 31
ADLS_ACUITY_SCORE: 29
ADLS_ACUITY_SCORE: 31
ADLS_ACUITY_SCORE: 29
ADLS_ACUITY_SCORE: 31
ADLS_ACUITY_SCORE: 29
ADLS_ACUITY_SCORE: 31
ADLS_ACUITY_SCORE: 29
ADLS_ACUITY_SCORE: 31
ADLS_ACUITY_SCORE: 29
ADLS_ACUITY_SCORE: 31

## 2025-01-08 NOTE — PROGRESS NOTES
[facility]    Neurosurgery  Daily Note    Assessment & Plan   Procedure(s):  Endoscopic endonasal resection of pituitary mass with stealth   1 Day Post-Op  MD: Dr. Ybarra    AM ROUNDS: pain controlled with oxy. Denies clear drainage, vision changes-blurry vision at baseline unchanged, weakness, n/v.   EXAM: II-XII grossly intact. Dried drainage in nose, no active or clear drainage. 5/5 BUE BLE. Negative drift. Smooth FTN BL  LABS: Na 139 ,urine specific gravity 1.020, endo panel pending  IMAGING:   EXAM: MR BRAIN WITHOUT AND WITH CONTRAST  LOCATION: [facility]  DATE: 01/08/2025                                                              IMPRESSION:  1.  There are recent postsurgical changes relating to transsphenoidal sella/suprasellar mass resection. No postprocedure complication is identified.  2.  Some residual enhancing material is demonstrated within the right aspect of the sella and extending into the right aspect of the suprasellar cistern with residual mass effect on the adjacent optic chiasm. This could reflect a postoperative change,   however, possibility of some residual tumor cannot be completely excluded and continued follow-up is therefore advised.    Plan:  7th floor q2H neuro checks 7a-7p, q4H 7p-7a    -MRI brain reviewed with DR. Ybarra; post op changes  - Endocrine lab panel on POD1 -pending  -Sodium and urine specific gravity q 6 hours  -Hydrocortisone taper x 1 week  -Antibiotics x 3 doses   -Nasal precautions  -Nasal saline spray at discharge. Okay to use up to 4 times per day.   -Nasal splints to remain in place x 3 weeks  -Follow up with Dr. Ospina at 3 weeks post op  -Follow up with Dr. Ybarra's team at 6 weeks and 12 weeks post op     Waleska Nguyen PA-C  [facility] Neurosurgery  [address] [place] [address]  [address] [place] [address]  Tel [phone]  Fax [phone]  Text page via [facility] Paging/Directory    Active Problems:    Suprasellar  mary carmen Galeano LICHA Landers PA-C    Interval History   Stable     Physical Exam   Temp: 98.1  F (36.7  C) Temp src: Oral BP: (!) 148/102 Pulse: 96   Resp: 16 SpO2: 97 % O2 Device: None (Room air)    Vitals:    01/07/25 0624 01/07/25 1547 01/08/25 0600   Weight: 94.8 kg (209 lb) 94.9 kg (209 lb 3.5 oz) 93.2 kg (205 lb 7.5 oz)     Vital Signs with Ranges  Temp:  [98  F (36.7  C)-98.9  F (37.2  C)] 98.1  F (36.7  C)  Pulse:  [] 96  Resp:  [14-16] 16  BP: (112-151)/() 148/102  MAP:  [87 mmHg-130 mmHg] 117 mmHg  Arterial Line BP: ()/() 160/91  SpO2:  [91 %-98 %] 97 %  I/O last 3 completed shifts:  In: 3110.71 [P.O.:438.7; I.V.:2672.01]  Out: 4630 [Urine:4605; Blood:25]    II-XII grossly intact. Dried drainage in nose, no active or clear drainage. 5/5 BUE BLE. Negative drift. Smooth FTN BL      Medications   Current Facility-Administered Medications   Medication Dose Route Frequency Provider Last Rate Last Admin      Current Facility-Administered Medications   Medication Dose Route Frequency Provider Last Rate Last Admin    acetaminophen (TYLENOL) tablet 975 mg  975 mg Oral Q8H Lorin Burleson NP   975 mg at 01/08/25 0434    ceFAZolin (ANCEF) 2 g in 100 mL D5W intermittent infusion  2 g Intravenous Q8H Lorin Burleson  mL/hr at 01/08/25 0848 2 g at 01/08/25 0848    gabapentin (NEURONTIN) capsule 200 mg  200 mg Oral QAM Lorin Burleson NP   200 mg at 01/08/25 0806    gabapentin (NEURONTIN) capsule 600 mg  600 mg Oral At Bedtime Lorin Burleson NP   600 mg at 01/07/25 2206    [START ON 1/9/2025] hydrocortisone (CORTEF) tablet 40 mg  40 mg Oral BID Lorin Burleson NP        And    [START ON 1/9/2025] hydrocortisone (CORTEF) tablet 20 mg  20 mg Oral BID Lorin Burleson NP        And    [START ON 1/10/2025] hydrocortisone (CORTEF) tablet 20 mg  20 mg Oral Daily Lorin Burleson NP        And    [START ON 1/10/2025] hydrocortisone (CORTEF)  tablet 10 mg  10 mg Oral Daily Lorin Burleson NP        hydrocortisone sodium succinate PF (solu-CORTEF) injection 50 mg  50 mg Intravenous Q8H Lorin Burleson NP        insulin aspart (NovoLOG) injection (RAPID ACTING)  1-7 Units Subcutaneous TID AC Rachel Brooks PA-C   2 Units at 01/08/25 0804    insulin aspart (NovoLOG) injection (RAPID ACTING)  1-5 Units Subcutaneous At Bedtime Rachel Brooks PA-C   1 Units at 01/07/25 2214    levonorgestrel-ethinyl estradiol (NORDETTE) 0.15-30 MG-MCG per tablet 1 tablet  1 tablet Oral At Bedtime Lorin Burleson NP   1 tablet at 01/07/25 2206    polyethylene glycol (MIRALAX) Packet 17 g  17 g Oral Daily Lorin Burleson NP        senna-docusate (SENOKOT-S/PERICOLACE) 8.6-50 MG per tablet 1 tablet  1 tablet Oral BID Lorin Burleson NP        sodium chloride (PF) 0.9% PF flush 3 mL  3 mL Intracatheter Q8H Lorin Burleson NP   3 mL at 01/08/25 0805

## 2025-01-08 NOTE — PROGRESS NOTES
"   01/08/25 1400   Appointment Info   Signing Clinician's Name / Credentials (PT) Jaquelin Nichols PT, DPT   Living Environment   People in Home spouse;child(osvaldo), dependent  (Per pt \"My 's son lives with us.\")   Current Living Arrangements house   Home Accessibility no concerns  (Stairs to enter, but ramp is also available.)   Transportation Anticipated car, drives self;family or friend will provide   Self-Care   Usual Activity Tolerance good   Current Activity Tolerance moderate   Regular Exercise No   Equipment Currently Used at Home none   Fall history within last six months no   Activity/Exercise/Self-Care Comment 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   General Information   Onset of Illness/Injury or Date of Surgery 01/07/25   Referring Physician Lorin Burleson NP   Patient/Family Therapy Goals Statement (PT) None stated.   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   Existing Precautions/Restrictions other (see comments)  (Nasal precautions post endonasal resection)   General Observations Pt's Mom and Brother at bedside.   Cognition   Affect/Mental Status (Cognition) WNL   Orientation Status (Cognition) oriented x 4   Follows Commands (Cognition) WNL   Pain Assessment   Patient Currently in Pain Yes, see Vital Sign flowsheet  (Head ache, but states it's not new with moving about.)   Integumentary/Edema   Integumentary/Edema Comments Pt reports some drainage off/on via the nose. Placed jarad/nasal sling for moboility. Does report feeling drainage with  position changes.   Posture    Posture Forward head position;Protracted shoulders   Range of Motion (ROM)   ROM Comment B LEs and UEs WFL as obs via AROM and mobility   Strength (Manual Muscle Testing)   Strength Comments Demonstrates antigravity strength with functional mobility, no focal " weakness noted L to R.   Bed Mobility   Comment, (Bed Mobility) Assist for line management only, sit pivot to EOB.   Gait/Stairs (Locomotion)   Comment, (Gait/Stairs) SBA sit<>stand   Balance   Balance Comments Static sitting, dynamic sitting independent. Standing balance static no increase in sway, dynamic stnading pt reports feeling better with B UEspport. Use of walker for standing tasks. SBA.   Sensory Examination   Sensory Perception Comments Denies n/t   Coordination   Coordination Comments Neuros intact with assessment including VOR, smooth persuit and scaccades.   Clinical Impression   Criteria for Skilled Therapeutic Intervention Yes, treatment indicated   PT Diagnosis (PT) Impaired functional act josé miguel   Influenced by the following impairments Post op fatigue, generalized weakness and precuations   Functional limitations due to impairments Decreased functional independence iwth mobiltiy   Clinical Presentation (PT Evaluation Complexity) stable   Clinical Presentation Rationale see MR   Clinical Decision Making (Complexity) low complexity   Planned Therapy Interventions (PT) balance training;bed mobility training;gait training;home exercise program;neuromuscular re-education;patient/family education;ROM (range of motion);stair training;strengthening;stretching;transfer training;progressive activity/exercise;home program guidelines   Risk & Benefits of therapy have been explained evaluation/treatment results reviewed;care plan/treatment goals reviewed;risks/benefits reviewed;current/potential barriers reviewed;participants voiced agreement with care plan;participants included;patient;mother;sibling   PT Total Evaluation Time   PT Eval, Low Complexity Minutes (85044) 10   Physical Therapy Goals   PT Frequency One time eval and treatment only   PT Predicted Duration/Target Date for Goal Attainment 01/08/25   PT Goals Bed Mobility;Transfers;Gait   PT: Bed Mobility Supervision/stand-by assist;Supine to/from  sit;Goal Met   PT: Transfers Supervision/stand-by assist;Sit to/from stand;Bed to/from chair;Assistive device;Goal Met   PT: Gait Supervision/stand-by assist;Assistive device;Greater than 200 feet;Goal Met   Interventions   Interventions Quick Adds Therapeutic Activity;Therapeutic Procedure   Therapeutic Procedure/Exercise   Ther. Procedure: strength, endurance, ROM, flexibillity Minutes (82783) 15   Treatment Detail/Skilled Intervention Ambualted for CV benefit, 10 min in hallway use of walker and SBA, increased time for set up, edu on walker use and VS during and post activity.  See VSFS. Pt rpeorts feeling drainage from the nose with position change. Able to dual task during ambulation, no LOB, stable VS thorughout.   Therapeutic Activity   Therapeutic Activities: dynamic activities to improve functional performance Minutes (76418) 15   Treatment Detail/Skilled Intervention Reveiwed nasal precautions. Dangled EOB for BP assessment pre activity, BP just at parameter of 150/90. Pt completed hair brushing and tied hair up during EOB-no LOB, good movement wit rotation and reaching.  Sit<>stand fromelevated bed surface, SBA with cues for hand placement. Stand>sit in recliner cued fo rhand placement and use of arm rest. Up inchair, feet flat, pillow forlumbar support, alarm on. Reviweed no need forfuther skilled PT intervention, pt inagreement.  Edu on importance of activity, ambulation and up to chair at least 3 x per day durin gLOS. Edu on need for at least 30 min of ambulation per day outside of normal activity. Edu on stair negotiation, step to pattern for safety, ange since unable to look at the feet (nasal precautions).   PT Discharge Planning   PT Plan disch from PT oals met   PT Discharge Recommendation (DC Rec) home with assist   PT Rationale for DC Rec Recommend home with supervision on stairs and with ADL tasks inorder to maintain nasal precautions. Pt is up with SBA for lines, no physical assist for  mobility. Feels more comfortable with walker use, walker left in room; howver, do not anticipate need for walker at discharge. Pt in agreement with disch from PT.   PT Brief overview of current status Supervision/SBA seconadry to lines. Should walk the halls with nursing and or family. Goals of therapy will be to address safe mobility and make recs for d/c to next level of care. Pt and RN will continue to follow all falls risk precautions as documented by RN staff while hospitalized.   Physical Therapy Time and Intention   Timed Code Treatment Minutes 30   Total Session Time (sum of timed and untimed services) 40

## 2025-01-08 NOTE — PLAN OF CARE
Problem: Adult Inpatient Plan of Care  Goal: Plan of Care Review    Outcome: Progressing  Flowsheets (Taken 1/8/2025 1130)  Plan of Care Reviewed With: patient  Overall Patient Progress: improving  Goal: Patient-Specific Goal (Individualized)    Outcome: Progressing  Goal: Absence of Hospital-Acquired Illness or Injury  Outcome: Progressing  Intervention: Identify and Manage Fall Risk  Recent Flowsheet Documentation  Taken 1/8/2025 1200 by Venessa Cazares RN  Safety Promotion/Fall Prevention:   activity supervised   clutter free environment maintained   patient and family education   treat reversible contributory factors   safety round/check completed  Taken 1/8/2025 0800 by Venessa Cazares RN  Safety Promotion/Fall Prevention:   activity supervised   clutter free environment maintained   patient and family education   treat reversible contributory factors   safety round/check completed  Intervention: Prevent Skin Injury  Recent Flowsheet Documentation  Taken 1/8/2025 1200 by Venessa Cazares RN  Body Position:   weight shifting   position changed independently  Taken 1/8/2025 0800 by Venessa Cazares RN  Body Position:   weight shifting   position changed independently  Intervention: Prevent and Manage VTE (Venous Thromboembolism) Risk  Recent Flowsheet Documentation  Taken 1/8/2025 1200 by Venessa Cazares RN  VTE Prevention/Management: SCDs on (sequential compression devices)  Taken 1/8/2025 0800 by Venessa Cazares RN  VTE Prevention/Management: SCDs on (sequential compression devices)  Goal: Optimal Comfort and Wellbeing  Outcome: Progressing  Intervention: Monitor Pain and Promote Comfort  Recent Flowsheet Documentation  Taken 1/8/2025 0806 by Venessa Cazares RN  Pain Management Interventions: medication (see MAR)  Intervention: Provide Person-Centered Care  Recent Flowsheet Documentation  Taken 1/8/2025 1200 by Venessa Cazares RN  Trust Relationship/Rapport:   care explained   choices provided   reassurance provided    questions answered   emotional support provided   empathic listening provided   thoughts/feelings acknowledged  Taken 1/8/2025 0800 by Venessa Cazares RN  Trust Relationship/Rapport:   care explained   choices provided   reassurance provided   questions answered   emotional support provided   empathic listening provided   thoughts/feelings acknowledged  Goal: Readiness for Transition of Care  Outcome: Progressing   Goal Outcome Evaluation:      Plan of Care Reviewed With: patient    Overall Patient Progress: improvingOverall Patient Progress: improving    A/O x4. Neuros intact. Tele NSR. SBP < 150. Bilateral nares, scant bloody drainage. Pain managed w/ oxycodone, robaxin, tylenol.Tolerating mod CHO diet. Zofran x1. Plan to keep linares in today per neuro, adequate output. Nasal precautions maintained. Family updated at bedside.

## 2025-01-08 NOTE — PLAN OF CARE
"Goal Outcome Evaluation:    Problem: Adult Inpatient Plan of Care  Goal: Plan of Care Review  Description: The Plan of Care Review/Shift note should be completed every shift.  The Outcome Evaluation is a brief statement about your assessment that the patient is improving, declining, or no change.  This information will be displayed automatically on your shift  note.  1/8/2025 0630 by Nati Chappell RN  Outcome: Progressing  Flowsheets (Taken 1/8/2025 0630)  Outcome Evaluation: POD 1 pituitary mass resection. q2 neuros, AOx4, neuros intact, see flowsheets for details. Nasal stents in place with small amount of bloody drainage, Neurosurg aware. Oxycodone and tylenol given for headache.Tele SR, 20 mg labetalol given x1 for SBP>150. On room air. Tolerating clear liquids, no BM, passing gas. Up 1/standby. Rangel in place, q1hr outputs, urine SG stable. MRI completed. PIVs SL. Na WNL. BG checks w/ insulin. Artline in place but unreliable for BP, cuff pressures followed. Plan to transfer to  today.  Plan of Care Reviewed With: patient  Overall Patient Progress: improving  1/8/2025 0600 by Nati Chappell RN  Outcome: Progressing  Flowsheets (Taken 1/8/2025 0600)  Plan of Care Reviewed With: patient  Goal: Patient-Specific Goal (Individualized)  Description: You can add care plan individualizations to a care plan. Examples of Individualization might be:  \"Parent requests to be called daily at 9am for status\", \"I have a hard time hearing out of my right ear\", or \"Do not touch me to wake me up as it startles  me\".  1/8/2025 0630 by Nati Chappell RN  Outcome: Progressing  1/8/2025 0600 by Nati Chappell RN  Outcome: Progressing  Goal: Absence of Hospital-Acquired Illness or Injury  1/8/2025 0630 by Nati Chappell RN  Outcome: Progressing  1/8/2025 0600 by Nati Chappell RN  Outcome: Progressing  Intervention: Identify and Manage Fall Risk  Recent Flowsheet Documentation  Taken 1/8/2025 0400 by Nati Chappell " ELLA JACKSON  Safety Promotion/Fall Prevention:   activity supervised   clutter free environment maintained   patient and family education   treat reversible contributory factors   safety round/check completed  Taken 1/8/2025 0000 by Nati Chappell RN  Safety Promotion/Fall Prevention:   activity supervised   clutter free environment maintained   patient and family education   treat reversible contributory factors   safety round/check completed  Taken 1/7/2025 2000 by Nati Chappell RN  Safety Promotion/Fall Prevention:   activity supervised   clutter free environment maintained   patient and family education   treat reversible contributory factors   safety round/check completed  Intervention: Prevent Skin Injury  Recent Flowsheet Documentation  Taken 1/8/2025 0600 by Nati Chappell RN  Body Position:   weight shifting   position changed independently  Taken 1/8/2025 0400 by Nati Chappell RN  Body Position:   weight shifting   position changed independently  Taken 1/8/2025 0200 by Nati Chappell RN  Body Position:   weight shifting   position changed independently  Taken 1/8/2025 0000 by Nati Chappell RN  Body Position:   weight shifting   position changed independently  Taken 1/7/2025 2200 by Nati Chappell RN  Body Position:   weight shifting   position changed independently  Taken 1/7/2025 2000 by Nati Chappell RN  Body Position:   weight shifting   position changed independently  Intervention: Prevent and Manage VTE (Venous Thromboembolism) Risk  Recent Flowsheet Documentation  Taken 1/8/2025 0400 by Nati Chappell RN  VTE Prevention/Management: SCDs on (sequential compression devices)  Taken 1/8/2025 0000 by Nati Chappell RN  VTE Prevention/Management: SCDs on (sequential compression devices)  Taken 1/7/2025 2000 by Nati Chappell RN  VTE Prevention/Management: SCDs on (sequential compression devices)  Goal: Optimal Comfort and Wellbeing  1/8/2025 0630 by Nati Chappell  RN  Outcome: Progressing  1/8/2025 0600 by Nati Chappell RN  Outcome: Progressing  Intervention: Monitor Pain and Promote Comfort  Recent Flowsheet Documentation  Taken 1/8/2025 0630 by Nati Chappell RN  Pain Management Interventions:   medication (see MAR)   care clustered   emotional support  Intervention: Provide Person-Centered Care  Recent Flowsheet Documentation  Taken 1/8/2025 0400 by Nati Chappell RN  Trust Relationship/Rapport:   care explained   choices provided   questions encouraged   reassurance provided  Taken 1/8/2025 0000 by Nati Chappell RN  Trust Relationship/Rapport:   care explained   choices provided   questions encouraged   reassurance provided  Taken 1/7/2025 2000 by Nati Chappell RN  Trust Relationship/Rapport:   care explained   choices provided   questions encouraged   reassurance provided  Goal: Readiness for Transition of Care  1/8/2025 0630 by Nati Chappell RN  Outcome: Progressing  1/8/2025 0600 by Nati Chappell RN  Outcome: Progressing     Problem: Craniotomy/Craniectomy/Cranioplasty  Goal: Optimal Coping with Surgery  1/8/2025 0630 by Nati Chappell RN  Outcome: Progressing  1/8/2025 0600 by Nati Chappell RN  Outcome: Progressing  Intervention: Support Psychosocial Response to Surgery  Recent Flowsheet Documentation  Taken 1/7/2025 2000 by Nati Chappell RN  Supportive Measures:   active listening utilized   positive reinforcement provided   relaxation techniques promoted   self-care encouraged   verbalization of feelings encouraged  Goal: Absence of Bleeding  1/8/2025 0630 by Nati Chappell RN  Outcome: Progressing  1/8/2025 0600 by Nati Chappell RN  Outcome: Progressing  Goal: Effective Bowel Elimination  1/8/2025 0630 by Nati Chappell RN  Outcome: Progressing  1/8/2025 0600 by Nati Chappell RN  Outcome: Progressing  Goal: Optimal Cerebral Tissue Perfusion  1/8/2025 0630 by Nati Chappell RN  Outcome: Progressing  1/8/2025 0600  by Nati Chappell RN  Outcome: Progressing  Intervention: Protect and Optimize Cerebral Perfusion  Flowsheets (Taken 1/8/2025 0630)  Sensory Stimulation Regulation:   care clustered   lighting decreased   quiet environment promoted  Cerebral Perfusion Promotion:   blood pressure monitored   normothermia promoted  Goal: Fluid and Electrolyte Balance  1/8/2025 0630 by Nati Chappell RN  Outcome: Progressing  1/8/2025 0600 by Nati Chappell RN  Outcome: Progressing  Goal: Optimal Functional Ability  1/8/2025 0630 by Nati Chappell RN  Outcome: Progressing  1/8/2025 0600 by Nati Chappell RN  Outcome: Progressing  Intervention: Optimize Functional Ability  Flowsheets  Taken 1/8/2025 0630  Activity Management: activity adjusted per tolerance  Activity Assistance Provided: assistance, 1 person  Taken 1/8/2025 0400  Activity Management: activity adjusted per tolerance  Activity Assistance Provided: assistance, 1 person  Taken 1/8/2025 0000  Activity Management: activity adjusted per tolerance  Activity Assistance Provided: assistance, 1 person  Taken 1/7/2025 2000  Activity Management: activity adjusted per tolerance  Activity Assistance Provided: assistance, 1 person  Goal: Absence of Infection Signs and Symptoms  1/8/2025 0630 by Nati Chappell RN  Outcome: Progressing  1/8/2025 0600 by Nati Chappell RN  Outcome: Progressing  Goal: Anesthesia/Sedation Recovery  1/8/2025 0630 by Nati Chappell RN  Outcome: Progressing  1/8/2025 0600 by Nati Chappell RN  Outcome: Progressing  Intervention: Optimize Anesthesia Recovery  Recent Flowsheet Documentation  Taken 1/8/2025 0400 by Nati Chappell RN  Safety Promotion/Fall Prevention:   activity supervised   clutter free environment maintained   patient and family education   treat reversible contributory factors   safety round/check completed  Taken 1/8/2025 0000 by Nati Chappell RN  Safety Promotion/Fall Prevention:   activity supervised    clutter free environment maintained   patient and family education   treat reversible contributory factors   safety round/check completed  Taken 1/7/2025 2000 by Nati Chappell RN  Safety Promotion/Fall Prevention:   activity supervised   clutter free environment maintained   patient and family education   treat reversible contributory factors   safety round/check completed  Goal: Acceptable Pain Control  1/8/2025 0630 by Nati Chappell RN  Outcome: Progressing  1/8/2025 0600 by Nati Chappell RN  Outcome: Progressing  Intervention: Prevent or Manage Pain  Recent Flowsheet Documentation  Taken 1/8/2025 0630 by Nati Chappell RN  Pain Management Interventions:   medication (see MAR)   care clustered   emotional support  Goal: Nausea and Vomiting Relief  1/8/2025 0630 by Nati Chappell RN  Outcome: Progressing  1/8/2025 0600 by Nati Chappell RN  Outcome: Progressing  Intervention: Prevent or Manage Postoperative Nausea and Vomiting  Flowsheets (Taken 1/8/2025 0630)  Nausea/Vomiting Interventions:   antiemetic   nausea triggers minimized   sips of clear liquids given  Goal: Effective Urinary Elimination  1/8/2025 0630 by Nati Chappell RN  Outcome: Progressing  1/8/2025 0600 by Nati Chappell RN  Outcome: Progressing  Goal: Effective Oxygenation and Ventilation  1/8/2025 0630 by Nati Chappell RN  Outcome: Progressing  1/8/2025 0600 by Nati Chappell RN  Outcome: Progressing  Intervention: Optimize Oxygenation and Ventilation  Recent Flowsheet Documentation  Taken 1/8/2025 0600 by Nati Chappell RN  Head of Bed (HOB) Positioning: HOB at 45 degrees  Taken 1/8/2025 0400 by Nati Chappell RN  Head of Bed (HOB) Positioning: HOB at 45 degrees  Taken 1/8/2025 0200 by Nati Chappell RN  Head of Bed (HOB) Positioning: HOB at 45 degrees  Taken 1/8/2025 0000 by Nati Chappell RN  Head of Bed (HOB) Positioning: HOB at 45 degrees  Taken 1/7/2025 2200 by Nati Chappell RN  Head of Bed  ([facility]) Positioning: HOB at 45 degrees  Taken 1/7/2025 2000 by Nati Chappell RN  Head of Bed ([facility]) Positioning: HOB at 45 degrees     Problem: Pain Acute  Goal: Optimal Pain Control and Function  1/8/2025 0630 by Nati Chappell RN  Outcome: Progressing  1/8/2025 0600 by Nati Chappell RN  Outcome: Progressing  Intervention: Optimize Psychosocial Wellbeing  Recent Flowsheet Documentation  Taken 1/7/2025 2000 by Nati Chappell RN  Supportive Measures:   active listening utilized   positive reinforcement provided   relaxation techniques promoted   self-care encouraged   verbalization of feelings encouraged  Intervention: Develop Pain Management Plan  Flowsheets (Taken 1/8/2025 0630)  Pain Management Interventions:   medication (see MAR)   care clustered   emotional support  Intervention: Prevent or Manage Pain  Recent Flowsheet Documentation  Taken 1/8/2025 0630 by Nati Chappell RN  Sensory Stimulation Regulation:   care clustered   lighting decreased   quiet environment promoted  Taken 1/8/2025 0400 by Nati Chappell RN  Medication Review/Management: medications reviewed  Taken 1/8/2025 0000 by Nati Chappell RN  Medication Review/Management: medications reviewed  Taken 1/7/2025 2000 by Nati Chappell RN  Medication Review/Management: medications reviewed     Problem: Nausea and Vomiting  Goal: Nausea and Vomiting Relief  1/8/2025 0630 by Nati Chappell RN  Outcome: Progressing  1/8/2025 0600 by Nati Chappell RN  Outcome: Progressing  Intervention: Prevent and Manage Nausea and Vomiting  Flowsheets  Taken 1/8/2025 0630  Nausea/Vomiting Interventions:   antiemetic   nausea triggers minimized   sips of clear liquids given  Environmental Support:   calm environment promoted   comfort object encouraged   environmental consistency promoted   rest periods encouraged   personal routine supported  Taken 1/8/2025 0400  Oral Care: oral rinse provided  Taken 1/8/2025 0000  Oral Care: oral rinse  provided  Taken 1/7/2025 2000  Oral Care: oral rinse provided  Environmental Support:   calm environment promoted   comfort object encouraged   environmental consistency promoted   rest periods encouraged   personal routine supported

## 2025-01-09 VITALS
TEMPERATURE: 98.2 F | OXYGEN SATURATION: 97 % | HEIGHT: 61 IN | WEIGHT: 205.47 LBS | RESPIRATION RATE: 15 BRPM | HEART RATE: 97 BPM | BODY MASS INDEX: 38.79 KG/M2 | DIASTOLIC BLOOD PRESSURE: 92 MMHG | SYSTOLIC BLOOD PRESSURE: 145 MMHG

## 2025-01-09 LAB
GH SERPL-MCNC: 0.5 UG/L
GLUCOSE BLDC GLUCOMTR-MCNC: 270 MG/DL (ref 70–99)
GLUCOSE BLDC GLUCOMTR-MCNC: 276 MG/DL (ref 70–99)
GLUCOSE BLDC GLUCOMTR-MCNC: 304 MG/DL (ref 70–99)
GLUCOSE BLDC GLUCOMTR-MCNC: 324 MG/DL (ref 70–99)
GLUCOSE BLDC GLUCOMTR-MCNC: 342 MG/DL (ref 70–99)
GLUCOSE BLDC GLUCOMTR-MCNC: 367 MG/DL (ref 70–99)
GLUCOSE BLDC GLUCOMTR-MCNC: 405 MG/DL (ref 70–99)
SODIUM SERPL-SCNC: 134 MMOL/L (ref 135–145)
SODIUM SERPL-SCNC: 134 MMOL/L (ref 135–145)
SODIUM SERPL-SCNC: 137 MMOL/L (ref 135–145)
SODIUM SERPL-SCNC: 139 MMOL/L (ref 135–145)
SP GR UR STRIP: 1.01 (ref 1–1.03)
SP GR UR STRIP: 1.02 (ref 1–1.03)
TESTOST FREE SERPL-MCNC: 0.07 NG/DL
TESTOST SERPL-MCNC: 4 NG/DL (ref 8–60)

## 2025-01-09 PROCEDURE — 81003 URINALYSIS AUTO W/O SCOPE: CPT | Performed by: NURSE PRACTITIONER

## 2025-01-09 PROCEDURE — 250N000013 HC RX MED GY IP 250 OP 250 PS 637: Performed by: STUDENT IN AN ORGANIZED HEALTH CARE EDUCATION/TRAINING PROGRAM

## 2025-01-09 PROCEDURE — 99222 1ST HOSP IP/OBS MODERATE 55: CPT | Performed by: STUDENT IN AN ORGANIZED HEALTH CARE EDUCATION/TRAINING PROGRAM

## 2025-01-09 PROCEDURE — 84295 ASSAY OF SERUM SODIUM: CPT | Performed by: NURSE PRACTITIONER

## 2025-01-09 PROCEDURE — 250N000013 HC RX MED GY IP 250 OP 250 PS 637: Performed by: NURSE PRACTITIONER

## 2025-01-09 PROCEDURE — 250N000013 HC RX MED GY IP 250 OP 250 PS 637: Performed by: PHYSICIAN ASSISTANT

## 2025-01-09 PROCEDURE — 120N000013 HC R&B IMCU

## 2025-01-09 PROCEDURE — 36415 COLL VENOUS BLD VENIPUNCTURE: CPT | Performed by: NURSE PRACTITIONER

## 2025-01-09 PROCEDURE — 250N000011 HC RX IP 250 OP 636: Performed by: NURSE PRACTITIONER

## 2025-01-09 RX ORDER — DEXTROSE MONOHYDRATE 25 G/50ML
25-50 INJECTION, SOLUTION INTRAVENOUS
Status: DISCONTINUED | OUTPATIENT
Start: 2025-01-09 | End: 2025-01-10 | Stop reason: HOSPADM

## 2025-01-09 RX ORDER — NICOTINE POLACRILEX 4 MG
15-30 LOZENGE BUCCAL
Status: DISCONTINUED | OUTPATIENT
Start: 2025-01-09 | End: 2025-01-10 | Stop reason: HOSPADM

## 2025-01-09 RX ORDER — HYDROCORTISONE 10 MG/1
40 TABLET ORAL 2 TIMES DAILY
Status: DISCONTINUED | OUTPATIENT
Start: 2025-01-09 | End: 2025-01-10 | Stop reason: HOSPADM

## 2025-01-09 RX ORDER — HYDROCORTISONE 5 MG/1
20 TABLET ORAL DAILY
Status: DISCONTINUED | OUTPATIENT
Start: 2025-01-13 | End: 2025-01-10 | Stop reason: HOSPADM

## 2025-01-09 RX ORDER — HYDROCORTISONE 5 MG/1
20 TABLET ORAL 2 TIMES DAILY
Status: DISCONTINUED | OUTPATIENT
Start: 2025-01-11 | End: 2025-01-10 | Stop reason: HOSPADM

## 2025-01-09 RX ORDER — HYDROCORTISONE 5 MG/1
10 TABLET ORAL DAILY
Status: DISCONTINUED | OUTPATIENT
Start: 2025-01-15 | End: 2025-01-10 | Stop reason: HOSPADM

## 2025-01-09 RX ADMIN — HYDROCORTISONE 40 MG: 10 TABLET ORAL at 09:18

## 2025-01-09 RX ADMIN — OXYCODONE HYDROCHLORIDE 10 MG: 5 TABLET ORAL at 03:51

## 2025-01-09 RX ADMIN — ACETAMINOPHEN 975 MG: 325 TABLET, FILM COATED ORAL at 03:51

## 2025-01-09 RX ADMIN — SENNOSIDES AND DOCUSATE SODIUM 1 TABLET: 50; 8.6 TABLET ORAL at 20:50

## 2025-01-09 RX ADMIN — ACETAMINOPHEN 975 MG: 325 TABLET, FILM COATED ORAL at 13:15

## 2025-01-09 RX ADMIN — OXYCODONE HYDROCHLORIDE 10 MG: 5 TABLET ORAL at 07:58

## 2025-01-09 RX ADMIN — INSULIN ASPART 4 UNITS: 100 INJECTION, SOLUTION INTRAVENOUS; SUBCUTANEOUS at 13:18

## 2025-01-09 RX ADMIN — HYDROCORTISONE 20 MG: 5 TABLET ORAL at 09:19

## 2025-01-09 RX ADMIN — GABAPENTIN 200 MG: 100 CAPSULE ORAL at 09:18

## 2025-01-09 RX ADMIN — HYDROCORTISONE SODIUM SUCCINATE 50 MG: 100 INJECTION, POWDER, FOR SOLUTION INTRAMUSCULAR; INTRAVENOUS at 00:04

## 2025-01-09 RX ADMIN — GABAPENTIN 600 MG: 300 CAPSULE ORAL at 21:26

## 2025-01-09 RX ADMIN — LEVONORGESTREL AND ETHINYL ESTRADIOL 1 TABLET: KIT at 21:27

## 2025-01-09 RX ADMIN — HYDROCORTISONE 40 MG: 10 TABLET ORAL at 20:50

## 2025-01-09 RX ADMIN — SENNOSIDES AND DOCUSATE SODIUM 1 TABLET: 50; 8.6 TABLET ORAL at 09:19

## 2025-01-09 RX ADMIN — ONDANSETRON 4 MG: 2 INJECTION, SOLUTION INTRAMUSCULAR; INTRAVENOUS at 09:09

## 2025-01-09 RX ADMIN — INSULIN DEGLUDEC 40 UNITS: 100 INJECTION, SOLUTION SUBCUTANEOUS at 22:45

## 2025-01-09 RX ADMIN — OXYCODONE HYDROCHLORIDE 10 MG: 5 TABLET ORAL at 14:39

## 2025-01-09 RX ADMIN — ACETAMINOPHEN 975 MG: 325 TABLET, FILM COATED ORAL at 19:23

## 2025-01-09 RX ADMIN — OXYCODONE HYDROCHLORIDE 10 MG: 5 TABLET ORAL at 19:23

## 2025-01-09 RX ADMIN — INSULIN ASPART 3 UNITS: 100 INJECTION, SOLUTION INTRAVENOUS; SUBCUTANEOUS at 09:11

## 2025-01-09 ASSESSMENT — ACTIVITIES OF DAILY LIVING (ADL)
ADLS_ACUITY_SCORE: 30
ADLS_ACUITY_SCORE: 31
ADLS_ACUITY_SCORE: 30
ADLS_ACUITY_SCORE: 31
ADLS_ACUITY_SCORE: 30
ADLS_ACUITY_SCORE: 31
ADLS_ACUITY_SCORE: 30
ADLS_ACUITY_SCORE: 30
ADLS_ACUITY_SCORE: 31

## 2025-01-09 NOTE — PROGRESS NOTES
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 Amada MARTINEZ ROUNDS: pain controlled. Gradually improving on meds and therapies.  EXAM: intact  LABS: Na 134 ,urine specific gravity 1.015, urine output 100-190 overnight, endo panel pending    IMAGING:   EXAM: MR BRAIN WITHOUT AND WITH CONTRAST  LOCATION: [facility]  DATE: 01/08/2025                                                              IMPRESSION:  1.  There are recent postsurgical changes relating to transsphenoidal sella/suprasellar mass resection. No postprocedure complication is identified.  2.  Some residual enhancing material is demonstrated within the right aspect of the sella and extending into the right aspect of the suprasellar cistern with residual mass effect on the adjacent optic chiasm. This could reflect a postoperative change,   however, possibility of some residual tumor cannot be completely excluded and continued follow-up is therefore advised.     Plan:  7th floor q2H neuro checks 7a-7p, q4H 7p-7a     -MRI brain reviewed with DR. Ybarra; post op changes  - Endocrine lab panel on POD1 -pending  -Sodium and urine specific gravity q 6 hours  -Hydrocortisone taper x 1 week  -Antibiotics x 3 doses   -Nasal precautions  -Nasal saline spray at discharge. Okay to use up to 4 times per day.   -Nasal splints to remain in place x 3 weeks  -Follow up with Dr. Ospina at 3 weeks post op  -Follow up with Dr. Ybarra's team at 6 weeks and 12 weeks post op     Waleska Nguyen PA-C  [facility] Neurosurgery  [address] [place] [id number]  Tel [phone]  Fax [phone]  Text page via [facility] Paging/Directory      Active Problems:    Suprasellar mass     Waleska Landers PA-C    Interval History   Stable     Physical Exam   Temp: 97.6  F (36.4  C) Temp src: Oral BP: (!) 136/96  Pulse: 99   Resp: 16 SpO2: 96 % O2 Device: None (Room air)    Vitals:    01/07/25 0624 01/07/25 1547 01/08/25 0600   Weight: 94.8 kg (209 lb) 94.9 kg (209 lb 3.5 oz) 93.2 kg (205 lb 7.5 oz)     Vital Signs with Ranges  Temp:  [97.6  F (36.4  C)-98.5  F (36.9  C)] 97.6  F (36.4  C)  Pulse:  [] 99  Resp:  [16] 16  BP: (127-146)/() 136/96  SpO2:  [94 %-97 %] 96 %  I/O last 3 completed shifts:  In: 940 [P.O.:840; I.V.:100]  Out: 4010 [Urine:4010]    Awake, alert, oriented x 3  II-XII grossly intact  Dried drainage in nose . No clear fluid   5/5 BUE BLE motor strength   Negative drift       Medications   Current Facility-Administered Medications   Medication Dose Route Frequency Provider Last Rate Last Admin      Current Facility-Administered Medications   Medication Dose Route Frequency Provider Last Rate Last Admin    acetaminophen (TYLENOL) tablet 975 mg  975 mg Oral Q8H Gulbranson, Lorin L, NP   975 mg at 01/09/25 1315    gabapentin (NEURONTIN) capsule 200 mg  200 mg Oral QAM Gulbranson, Lorin L, NP   200 mg at 01/09/25 0918    gabapentin (NEURONTIN) capsule 600 mg  600 mg Oral At Bedtime Gulbranson, Lorin L, NP   600 mg at 01/08/25 2133    hydrocortisone (CORTEF) tablet 40 mg  40 mg Oral BID Gulbranson, Lorin L, NP   40 mg at 01/09/25 0918    And    hydrocortisone (CORTEF) tablet 20 mg  20 mg Oral BID Gulbranson, Lorin L, NP   20 mg at 01/09/25 0919    And    [START ON 1/10/2025] hydrocortisone (CORTEF) tablet 20 mg  20 mg Oral Daily Gulbranson, Lorin L, NP        And    [START ON 1/10/2025] hydrocortisone (CORTEF) tablet 10 mg  10 mg Oral Daily Gulbranson, Lorin L, NP        hydrocortisone sodium succinate PF (solu-CORTEF) injection 50 mg  50 mg Intravenous Q8H Lorin Burleson NP   50 mg at 01/09/25 0004    insulin aspart (NovoLOG) injection (RAPID ACTING)  1-7 Units Subcutaneous TID Rachel Hernandez PA-C   4 Units at 01/09/25 1318    insulin aspart (NovoLOG)  injection (RAPID ACTING)  1-5 Units Subcutaneous At Bedtime Rachel Brooks PA-C   4 Units at 01/08/25 2135    levonorgestrel-ethinyl estradiol (NORDETTE) 0.15-30 MG-MCG per tablet 1 tablet  1 tablet Oral At Bedtime Lorin Burleson NP   1 tablet at 01/08/25 2133    polyethylene glycol (MIRALAX) Packet 17 g  17 g Oral Daily Lorin Burleson NP        senna-docusate (SENOKOT-S/PERICOLACE) 8.6-50 MG per tablet 1 tablet  1 tablet Oral BID Lorin Burleson NP   1 tablet at 01/09/25 0919    sodium chloride (PF) 0.9% PF flush 3 mL  3 mL Intracatheter Q8H Lorin Burleson NP   3 mL at 01/09/25 0909

## 2025-01-09 NOTE — PLAN OF CARE
"1900-2330:    Reason for Admission: POD1 Endoscopic endonasal resection of pituitary mass by Dr. Ybarra    Cognitive/Mentation: A/Ox 4  Neuros/CMS: Intact ex baseline BV. Denies nausea this shift.  VS: VSS with SBP < 150.   Tele: NA.  /GI: Continent. Rangel patent. Last BM PTA; refused scheduled Senna tonight, states will take tomorrow if no result tonight.   Pulmonary: LS clear.  Pain: Reporting pain 3/10 for headache, states that PRN oxycodone given before floor transfer \"really helped.\".     Drains/Lines: 2 PIVs, both saline locked.  Skin: Small abrasion to her left ankle/shin. KONG.  Activity: Up indpt when in room. SBA in hallway; patient aware and calls appropriately when needed.  Diet: Mod-cho/regular textured with thin liquids. Takes pills whole with water. NO straws    Therapies recs: Home with assist; PT signed off.  Discharge: Pending    Aggression Stoplight Tool: Green    End of shift summary: Q2h strict I/Os. Juan Carlos in place. Blood glucose elevated at HS; corrected with sliding scale insulin. Recheck at 0200. Nasal precautions maintained. Slight bloody drainage from nose throughout shift. Unchanged from when in ICU. No clear drainage noted. Denied nausea this shift.       "

## 2025-01-09 NOTE — PLAN OF CARE
Reason for Admission: POD 2 Endoscopic Endonasal Transsphenoidal and Transcavernous resection of suprasellar mass    Cognitive/Mentation: A/Ox 4  Neuros/CMS: Intact ex blurry vision on baseline, generalized weakness although all extremities are 5/5.   VS: VSS on RA. SBP <150   Tele: N/A.  /GI: Continent by bowel. On linares cath. Last BM PTA.   Pulmonary: LS clear. Nasal drainage post surgery. No c/o SOB/  Pain: 5-6/10 incisional pain. PRN Oxy given x2 and scheduled tylenol    Drains/Lines: R and L PIV SL  Skin: Abrasion on L ankle/shin, nostril incision  Activity: SBA.  Diet: Mod Carb diet with thin liquids. Takes pills whole.     Therapies recs: Home with assist  Discharge: Pending    Aggression Stoplight Tool: Green    End of shift summary: Calm and cooperative with cares. Sleep interrupted by pain; pain meds provided and decreased stimuli promoted. On Q6H Na+ and USG checks. Strict I & O.

## 2025-01-09 NOTE — PLAN OF CARE
Problem: Adult Inpatient Plan of Care  Goal: Plan of Care Review  Description: The Plan of Care Review/Shift note should be completed every shift.  The Outcome Evaluation is a brief statement about your assessment that the patient is improving, declining, or no change.  This information will be displayed automatically on your shift  note.  Outcome: Progressing  Flowsheets (Taken 1/8/2025 1808)  Outcome Evaluation: pt alert and oriented x4, neuros intact. nasal stents in place with minimal amount of drainage-slight increase with ambulation. pt sbp less than 150. on ra with clear lung sounds. linares still in place per neurosurg request. specific gravity remains wnl. up with standby assist. na wnl. bg higher this day with insulin given. artline removed today. will transfer to 7th floor when bed is available. pain managed with tylenol and oxy. family updated at bedside.  Plan of Care Reviewed With: patient  Overall Patient Progress: improving     Problem: Adult Inpatient Plan of Care  Goal: Absence of Hospital-Acquired Illness or Injury  Intervention: Identify and Manage Fall Risk  Recent Flowsheet Documentation  Taken 1/8/2025 1600 by Torrie Donnelly RN  Safety Promotion/Fall Prevention:   activity supervised   clutter free environment maintained   patient and family education   treat reversible contributory factors   safety round/check completed  Intervention: Prevent Skin Injury  Recent Flowsheet Documentation  Taken 1/8/2025 1600 by Torrie Donnelly RN  Body Position:   position changed independently   weight shifting  Intervention: Prevent and Manage VTE (Venous Thromboembolism) Risk  Recent Flowsheet Documentation  Taken 1/8/2025 1600 by Torrie Donnelly RN  VTE Prevention/Management: SCDs off (sequential compression devices)  Goal: Optimal Comfort and Wellbeing  Intervention: Provide Person-Centered Care  Recent Flowsheet Documentation  Taken 1/8/2025 1600 by Torrie Donnelly RN  Trust Relationship/Rapport:    care explained   choices provided   reassurance provided   questions answered   emotional support provided   empathic listening provided   thoughts/feelings acknowledged     Problem: Craniotomy/Craniectomy/Cranioplasty  Goal: Optimal Coping with Surgery  Intervention: Support Psychosocial Response to Surgery  Recent Flowsheet Documentation  Taken 1/8/2025 1600 by Torrie Donnelly RN  Supportive Measures:   active listening utilized   positive reinforcement provided   goal-setting facilitated   relaxation techniques promoted  Goal: Absence of Bleeding  Intervention: Monitor and Manage Bleeding  Recent Flowsheet Documentation  Taken 1/8/2025 1600 by Torrie Donnelly RN  Bleeding Management: dressing monitored  Goal: Effective Bowel Elimination  Intervention: Enhance Bowel Motility and Elimination  Recent Flowsheet Documentation  Taken 1/8/2025 1600 by Torrie Donnelly RN  Bowel Elimination Management: relaxation techniques promoted  Goal: Optimal Cerebral Tissue Perfusion  Intervention: Protect and Optimize Cerebral Perfusion  Recent Flowsheet Documentation  Taken 1/8/2025 1600 by Torrie Donnelly RN  Sensory Stimulation Regulation:   care clustered   television on  Cerebral Perfusion Promotion: blood pressure monitored  Goal: Optimal Functional Ability  Intervention: Optimize Functional Ability  Recent Flowsheet Documentation  Taken 1/8/2025 1600 by Torrie Donnelly RN  Activity Management: activity adjusted per tolerance  Activity Assistance Provided: assistance, 1 person  Goal: Absence of Infection Signs and Symptoms  Intervention: Prevent or Manage Infection  Recent Flowsheet Documentation  Taken 1/8/2025 1600 by Torrie Donnelly RN  Infection Management: aseptic technique maintained  Goal: Anesthesia/Sedation Recovery  Intervention: Optimize Anesthesia Recovery  Recent Flowsheet Documentation  Taken 1/8/2025 1600 by Torrie Donnelly RN  Safety Promotion/Fall Prevention:   activity supervised   clutter free  environment maintained   patient and family education   treat reversible contributory factors   safety round/check completed  Goal: Effective Oxygenation and Ventilation  Intervention: Optimize Oxygenation and Ventilation  Recent Flowsheet Documentation  Taken 1/8/2025 1600 by Torrie Donnelly RN  Head of Bed (HOB) Positioning: HOB at 30-45 degrees     Problem: Pain Acute  Goal: Optimal Pain Control and Function  Intervention: Optimize Psychosocial Wellbeing  Recent Flowsheet Documentation  Taken 1/8/2025 1600 by Torrie Donnelly RN  Supportive Measures:   active listening utilized   positive reinforcement provided   goal-setting facilitated   relaxation techniques promoted  Intervention: Prevent or Manage Pain  Recent Flowsheet Documentation  Taken 1/8/2025 1600 by Torrie Donnelly RN  Sensory Stimulation Regulation:   care clustered   television on  Bowel Elimination Promotion:   adequate fluid intake promoted   ambulation promoted  Medication Review/Management: medications reviewed     Problem: Nausea and Vomiting  Goal: Nausea and Vomiting Relief  Intervention: Prevent and Manage Nausea and Vomiting  Recent Flowsheet Documentation  Taken 1/8/2025 1600 by Torrie Donnelly RN  Environmental Support:   calm environment promoted   rest periods encouraged   Goal Outcome Evaluation:      Plan of Care Reviewed With: patient    Overall Patient Progress: improvingOverall Patient Progress: improving    Outcome Evaluation: pt alert and oriented x4, neuros intact. nasal stents in place with minimal amount of drainage-slight increase with ambulation. pt sbp less than 150. on ra with clear lung sounds. linares still in place per neurosurg request. specific gravity remains wnl. up with standby assist. na wnl. bg higher this day with insulin given. artline removed today. will transfer to 7th floor when bed is available. pain managed with tylenol and oxy. family updated at bedside.

## 2025-01-10 VITALS
DIASTOLIC BLOOD PRESSURE: 85 MMHG | HEART RATE: 94 BPM | HEIGHT: 61 IN | TEMPERATURE: 98.4 F | SYSTOLIC BLOOD PRESSURE: 125 MMHG | BODY MASS INDEX: 38.79 KG/M2 | WEIGHT: 205.47 LBS | RESPIRATION RATE: 18 BRPM | OXYGEN SATURATION: 96 %

## 2025-01-10 LAB
ANNOTATION COMMENT IMP: NORMAL
CORTIS F 24H UR HPLC-MCNC: 68.6 UG/L
CORTIS F 24H UR-MRATE: NORMAL UG/D
CORTIS F/CREAT 24H UR: 108.89 UG/G CRT
CREAT 24H UR-MRATE: NORMAL MG/D
CREAT UR-MCNC: 63 MG/DL
EST. AVERAGE GLUCOSE BLD GHB EST-MCNC: 183 MG/DL
GLUCOSE BLDC GLUCOMTR-MCNC: 190 MG/DL (ref 70–99)
GLUCOSE BLDC GLUCOMTR-MCNC: 228 MG/DL (ref 70–99)
GLUCOSE BLDC GLUCOMTR-MCNC: 284 MG/DL (ref 70–99)
HBA1C MFR BLD: 8 %
IGF-I BLD-MCNC: 70 NG/ML (ref 82–279)
PATH REPORT.COMMENTS IMP SPEC: ABNORMAL
PATH REPORT.COMMENTS IMP SPEC: YES
PATH REPORT.FINAL DX SPEC: ABNORMAL
PATH REPORT.GROSS SPEC: ABNORMAL
PATH REPORT.INTRAOP OBS SPEC DOC: ABNORMAL
PATH REPORT.MICROSCOPIC SPEC OTHER STN: ABNORMAL
PATH REPORT.RELEVANT HX SPEC: ABNORMAL
PHOTO IMAGE: ABNORMAL
SODIUM SERPL-SCNC: 137 MMOL/L (ref 135–145)
SODIUM SERPL-SCNC: 139 MMOL/L (ref 135–145)
SODIUM SERPL-SCNC: 140 MMOL/L (ref 135–145)
SP GR UR STRIP: 1.02 (ref 1–1.03)

## 2025-01-10 PROCEDURE — 84295 ASSAY OF SERUM SODIUM: CPT | Performed by: NURSE PRACTITIONER

## 2025-01-10 PROCEDURE — 999N000157 HC STATISTIC RCP TIME EA 10 MIN

## 2025-01-10 PROCEDURE — 36415 COLL VENOUS BLD VENIPUNCTURE: CPT | Performed by: STUDENT IN AN ORGANIZED HEALTH CARE EDUCATION/TRAINING PROGRAM

## 2025-01-10 PROCEDURE — 99233 SBSQ HOSP IP/OBS HIGH 50: CPT | Performed by: STUDENT IN AN ORGANIZED HEALTH CARE EDUCATION/TRAINING PROGRAM

## 2025-01-10 PROCEDURE — 83036 HEMOGLOBIN GLYCOSYLATED A1C: CPT | Performed by: STUDENT IN AN ORGANIZED HEALTH CARE EDUCATION/TRAINING PROGRAM

## 2025-01-10 PROCEDURE — 250N000013 HC RX MED GY IP 250 OP 250 PS 637: Performed by: PHYSICIAN ASSISTANT

## 2025-01-10 PROCEDURE — 36415 COLL VENOUS BLD VENIPUNCTURE: CPT | Performed by: NURSE PRACTITIONER

## 2025-01-10 PROCEDURE — 81003 URINALYSIS AUTO W/O SCOPE: CPT | Performed by: NURSE PRACTITIONER

## 2025-01-10 PROCEDURE — 250N000013 HC RX MED GY IP 250 OP 250 PS 637: Performed by: NURSE PRACTITIONER

## 2025-01-10 PROCEDURE — 250N000009 HC RX 250: Performed by: STUDENT IN AN ORGANIZED HEALTH CARE EDUCATION/TRAINING PROGRAM

## 2025-01-10 PROCEDURE — 94640 AIRWAY INHALATION TREATMENT: CPT

## 2025-01-10 RX ORDER — OXYCODONE HYDROCHLORIDE 5 MG/1
5-10 TABLET ORAL EVERY 4 HOURS PRN
Qty: 30 TABLET | Refills: 0 | Status: SHIPPED | OUTPATIENT
Start: 2025-01-10 | End: 2025-01-15

## 2025-01-10 RX ORDER — IPRATROPIUM BROMIDE AND ALBUTEROL SULFATE 2.5; .5 MG/3ML; MG/3ML
3 SOLUTION RESPIRATORY (INHALATION) ONCE
Status: COMPLETED | OUTPATIENT
Start: 2025-01-10 | End: 2025-01-10

## 2025-01-10 RX ORDER — AMOXICILLIN 250 MG
1 CAPSULE ORAL 2 TIMES DAILY PRN
Qty: 20 TABLET | Refills: 0 | Status: SHIPPED | OUTPATIENT
Start: 2025-01-10

## 2025-01-10 RX ORDER — METHOCARBAMOL 500 MG/1
500-1000 TABLET, FILM COATED ORAL 3 TIMES DAILY PRN
Qty: 30 TABLET | Refills: 0 | Status: SHIPPED | OUTPATIENT
Start: 2025-01-10

## 2025-01-10 RX ORDER — HYDROCORTISONE 10 MG/1
TABLET ORAL
Qty: 14 TABLET | Refills: 0 | Status: SHIPPED | OUTPATIENT
Start: 2025-01-10 | End: 2025-01-16

## 2025-01-10 RX ADMIN — METHOCARBAMOL 500 MG: 500 TABLET ORAL at 00:25

## 2025-01-10 RX ADMIN — OXYCODONE HYDROCHLORIDE 10 MG: 5 TABLET ORAL at 04:20

## 2025-01-10 RX ADMIN — GABAPENTIN 200 MG: 100 CAPSULE ORAL at 08:58

## 2025-01-10 RX ADMIN — SENNOSIDES AND DOCUSATE SODIUM 1 TABLET: 50; 8.6 TABLET ORAL at 08:59

## 2025-01-10 RX ADMIN — ACETAMINOPHEN 975 MG: 325 TABLET, FILM COATED ORAL at 04:20

## 2025-01-10 RX ADMIN — HYDROCORTISONE 40 MG: 10 TABLET ORAL at 08:59

## 2025-01-10 RX ADMIN — OXYCODONE HYDROCHLORIDE 5 MG: 5 TABLET ORAL at 12:22

## 2025-01-10 RX ADMIN — OXYCODONE HYDROCHLORIDE 10 MG: 5 TABLET ORAL at 00:25

## 2025-01-10 RX ADMIN — IPRATROPIUM BROMIDE AND ALBUTEROL SULFATE 3 ML: .5; 3 SOLUTION RESPIRATORY (INHALATION) at 11:24

## 2025-01-10 RX ADMIN — ACETAMINOPHEN 975 MG: 325 TABLET, FILM COATED ORAL at 12:22

## 2025-01-10 ASSESSMENT — ACTIVITIES OF DAILY LIVING (ADL)
ADLS_ACUITY_SCORE: 30
ADLS_ACUITY_SCORE: 28
ADLS_ACUITY_SCORE: 30
ADLS_ACUITY_SCORE: 28
ADLS_ACUITY_SCORE: 30
ADLS_ACUITY_SCORE: 28
ADLS_ACUITY_SCORE: 28
ADLS_ACUITY_SCORE: 30

## 2025-01-10 NOTE — CONSULTS
"[facility]  Consult Note - Hospitalist Service  Date of Admission:  1/7/2025  Consult Requested by: Neurosurgery  Reason for Consult: Diabetes management    Assessment & Plan   Sofia Hdz is a 34 year old female admitted on 1/7/2025. She has previous medical history of C6-C7 arthroplasty, C4 partial laminectomy, diabetes mellitus type 2 on long-term insulin  Patient underwent endoscopic endonasal transsphenoidal entrance cavernous resection of suprasellar mass          # Postop day 1 status post endoscopic endonasal transsphenoidal and  transcavernous resection of suprasellar mass  -Further management as per neurosurgery  -Pain management, DVT prophylaxis per neurosurgery        # Diabetes mellitus type 2  -PTA regimen includes Tresiba 90 units,, glipizide 10 mg twice daily, Rybelsus 14 mg daily, Farxiga 10 mg every morning  -Patient's sugars today in the 400s patient is also on steroid taper  -Medium dose sliding scale changed to high-dose sliding scale  -NovoLog 1 unit for 10 g of carb  -Restarted PTA Tresiba at 40 units and at night and might need adjustment based on sugars tomorrow  -Hypoglycemia protocol         Clinically Significant Risk Factors         # Hyponatremia: Lowest Na = 134 mmol/L in last 2 days, will monitor as appropriate                      # Obesity: Estimated body mass index is 38.82 kg/m  as calculated from the following:    Height as of this encounter: 1.549 m (5' 1\").    Weight as of this encounter: 93.2 kg (205 lb 7.5 oz)., PRESENT ON ADMISSION            Joaquín Moses MD  Hospitalist Service  Securely message with [employer] (more info)  Text page via [facility] Paging/Directory   ______________________________________________________________________    Chief Complaint       History is obtained from the patient  History of Present Illness   Sofia Hdz is a 34 year old female who past medical history of diabetes mellitus type 2, cervical " spondylosis  Underwent endonasal endoscopic resection of suprasellar mass  By ENT and neurosurgery  Medicine team consulted for diabetes management  Discussed with neurosurgery DOMINGO  Patient seen and examined at bedside    Patient's sugars have been elevated in the 400S    Her sugars are well-controlled below 100 at home.  She follows with endocrine for management of diabetes and pituitary mass.    She also takes NovoLog sliding scale    Also discussed with patient and patient's  about the treatment plan    Also discussed with patient's nurse      Past Medical History    Past Medical History:   Diagnosis Date    Cervical radiculopathy     Cervical spinal stenosis     Cervical spondylosis     Chronic ulcer of left lower extremity with fat layer exposed (H)     Diabetes mellitus (H)     Limb pain     Neck pain     Necrobiosis lipoidica diabeticorum (H)     Obesity     Prolactinoma (H)     Pyoderma gangrenosum (H)        Past Surgical History   Past Surgical History:   Procedure Laterality Date    C4-5 FORAMINOTOMY AND  DECOMPRESSION Bilateral 05/2023    CERVICAL DISCECTOMY      IR LUMBAR PUNCTURE  02/13/2023    OPTICAL TRACKING SYSTEM ENDOSCOPIC ENDONASAL SURGERY N/A 1/7/2025    Procedure: Endoscopic endonasal resection of pituitary mass with stealth;  Surgeon: Oscar Ybarra MD;  Location:  OR       Medications   I have reviewed this patient's current medications     Physical Exam  Cardiovascular:      Rate and Rhythm: Normal rate and regular rhythm.   Pulmonary:      Effort: Pulmonary effort is normal. No respiratory distress.      Breath sounds: Normal breath sounds.   Abdominal:      General: There is no distension.      Palpations: Abdomen is soft.      Tenderness: There is no abdominal tenderness.           Physical Exam   Vital Signs: Temp: 98.1  F (36.7  C) Temp src: Axillary BP: 135/86 Pulse: 97   Resp: 16 SpO2: 96 % O2 Device: None (Room air)    Weight: 205 lbs 7.5 oz        Medical Decision  Making       45 MINUTES SPENT BY ME on the date of service doing chart review, history, exam, documentation & further activities per the note.      Data     I have personally reviewed the following data over the past 24 hrs:    N/A  \   N/A   / N/A     134 (L) N/A N/A /  405 (H)   N/A N/A N/A \       Imaging results reviewed over the past 24 hrs:   No results found for this or any previous visit (from the past 24 hours).

## 2025-01-10 NOTE — PROGRESS NOTES
"    Neurosurgery Progress Note    Date of Service (when I saw the patient): 01/10/2025     Assessment & Plan     Procedure(s):  Endoscopic endonasal resection of pituitary mass with stealth   -3 Days Post-Op    AM Rounds: pain controlled, reporting wheezing and ongoing stuffiness    Exam: intact    Labs: Na137, USG yesterday 1.025    Plan:  -Na and USG q6h  -Hydrocortisone taper  -Nasal precautions  -Nasal saline spray at discharge. Okay to use up to 4 times per day.   -Nasal splints to remain in place x 3 weeks  -Follow up with Dr. Ospina at 3 weeks post op, patient to let us know where she would like to follow up   -Follow up with Dr. Ybarra's team at 6 weeks and 12 weeks post op   -Discharge when medically cleared    I have discussed the following assessment and plan Dr. Ybarra who is in agreement with initial plan and will follow up with further consultation recommendations.    Lorin Burleson, VANDANA  [facility] Neurosurgery  [address] [place] [address]  Tel [phone]  Fax [phone]  Securely message with [employer] (more info)  Text page via [employer] Paging/Directory     Interval History   Wheezing    Physical Exam   Temp: 98.4  F (36.9  C) Temp src: Oral BP: 125/85 Pulse: 94   Resp: 18 SpO2: 96 % O2 Device: None (Room air)    Vitals:    01/07/25 0624 01/07/25 1547 01/08/25 0600   Weight: 94.8 kg (209 lb) 94.9 kg (209 lb 3.5 oz) 93.2 kg (205 lb 7.5 oz)     Vital Signs with Ranges  Temp:  [97.6  F (36.4  C)-98.4  F (36.9  C)] 98.4  F (36.9  C)  Pulse:  [94-99] 94  Resp:  [15-18] 18  BP: (125-145)/(85-96) 125/85  SpO2:  [96 %-97 %] 96 %  I/O last 3 completed shifts:  In: 440 [P.O.:440]  Out: 250 [Urine:250]     , Blood pressure 125/85, pulse 94, temperature 98.4  F (36.9  C), temperature source Oral, resp. rate 18, height 1.549 m (5' 1\"), weight 93.2 kg (205 lb 7.5 oz), last menstrual period 12/30/2024, SpO2 96%.  205 lbs 7.5 oz    NEUROLOGICAL " EXAMINATION:   Mental status:  Alert and Oriented x 3, speech is fluent.  Cranial nerves:  II-XII intact.   Motor:  Strength is 5/5 throughout the upper and lower extremities  Shoulder Abduction:  Right:  5/5   Left:  5/5  Biceps:                      Right:  5/5   Left:  5/5  Triceps:                     Right:  5/5   Left:  5/5  Wrist Extensors:       Right:  5/5   Left:  5/5  Wrist Flexors:           Right:  5/5   Left:  5/5  interosseus :            Right:  5/5   Left:  5/5   Hip Flexor:                Right: 5/5  Left:  5/5  Hip Adductor:             Right:  5/5  Left:  5/5  Hip Abductor:             Right:  5/5  Left:  5/5  Gastroc Soleus:        Right:  5/5  Left:  5/5  Tib/Ant:                      Right:  5/5  Left:  5/5  EHL:                     Right:  5/5  Left:  5/5  Sensation:  intact  Coordination:  Smooth finger to nose testing.   Negative pronator drift.       Medications   Current Facility-Administered Medications   Medication Dose Route Frequency Provider Last Rate Last Admin     Current Facility-Administered Medications   Medication Dose Route Frequency Provider Last Rate Last Admin    acetaminophen (TYLENOL) tablet 975 mg  975 mg Oral Q8H Lorin Burleson, NP   975 mg at 01/10/25 0420    gabapentin (NEURONTIN) capsule 200 mg  200 mg Oral QAM Lorin Burleson L, NP   200 mg at 01/10/25 0858    gabapentin (NEURONTIN) capsule 600 mg  600 mg Oral At Bedtime Lorin Burleson, NP   600 mg at 01/09/25 2126    hydrocortisone (CORTEF) tablet 40 mg  40 mg Oral BID Waleska Landers PA-C   40 mg at 01/10/25 0859    And    [START ON 1/11/2025] hydrocortisone (CORTEF) tablet 20 mg  20 mg Oral BID Waleska Landers PA-C        And    [START ON 1/13/2025] hydrocortisone (CORTEF) tablet 20 mg  20 mg Oral Daily Wlaeska Landers PA-C        And    [START ON 1/15/2025] hydrocortisone (CORTEF) tablet 10 mg  10 mg Oral Daily Waleska Landers PA-C        insulin aspart (NovoLOG) injection (RAPID  "ACTING)  1-10 Units Subcutaneous TID AC Joaquín Moses MD   3 Units at 01/10/25 0950    insulin aspart (NovoLOG) injection (RAPID ACTING)  1-7 Units Subcutaneous At Bedtime Joaquín Moses MD   6 Units at 01/09/25 2215    insulin aspart (NovoLOG) injection (RAPID ACTING)   Subcutaneous TID w/meals Joaquín Moses MD   6 Units at 01/10/25 0951    insulin degludec (TRESIBA) 100 UNIT/ML injection 50 Units  50 Units Subcutaneous At Bedtime Mikayla Ha MD        ipratropium - albuterol 0.5 mg/2.5 mg/3 mL (DUONEB) neb solution 3 mL  3 mL Nebulization Once Mikayla Ha MD        levonorgestrel-ethinyl estradiol (NORDETTE) 0.15-30 MG-MCG per tablet 1 tablet  1 tablet Oral At Bedtime Lorin Burleson NP   1 tablet at 01/09/25 2127    polyethylene glycol (MIRALAX) Packet 17 g  17 g Oral Daily Lorin Burleson NP        senna-docusate (SENOKOT-S/PERICOLACE) 8.6-50 MG per tablet 1 tablet  1 tablet Oral BID Lorin Burleson NP   1 tablet at 01/10/25 0859    sodium chloride (PF) 0.9% PF flush 3 mL  3 mL Intracatheter Q8H Lorin Burleson NP   3 mL at 01/10/25 0901       Data     CBC RESULTS: No results for input(s): \"WBC\", \"RBC\", \"HGB\", \"HCT\", \"MCV\", \"MCH\", \"MCHC\", \"RDW\", \"PLT\" in the last 67943 hours.  Basic Metabolic Panel:  Lab Results   Component Value Date     01/10/2025      Lab Results   Component Value Date    POTASSIUM 3.8 01/07/2025     No results found for: \"CHLORIDE\"  No results found for: \"NILES\"  No results found for: \"CO2\"  No results found for: \"BUN\"  No results found for: \"CR\"  Lab Results   Component Value Date     01/10/2025     INR:  No results found for: \"INR\"      "

## 2025-01-10 NOTE — PLAN OF CARE
Reason for Admission: endonasal transphenoidal tumor resection POD 2    Cognitive/Mentation: A/Ox 4  Neuros/CMS: Intact except some blurry vision  VS: stable. .  /GI: Continent. .   Pulmonary: LS clear.  Pain: incision pain/ oxycodone x2 and scheduled tylenol.     Drains/Lines: PIV  Skin: intact  Activity:  Independent.  Diet: mod carb with thin liquids. Takes pills whole.     Therapies recs: home  Discharge: plan tomorrow    Aggression Stoplight Tool: green    End of shift summary: patient running high blood sugars, increased sliding scale to high, and also covering carbs for better control. Patient is on steroids which is elevating her sugars

## 2025-01-10 NOTE — PLAN OF CARE
Goal Outcome Evaluation:             Pt was discharged home around 1515 via  transport, all meds and paperwork completed.

## 2025-01-10 NOTE — DISCHARGE SUMMARY
[facility]    Discharge Summary  Neurosurgery    Date of Admission:  1/7/2025  Date of Discharge:  1/10/2025  Discharging Provider: Lorin Burleson NP  Date of Service (when I saw the patient): 01/10/25    Discharge Diagnoses   Active Problems:    Suprasellar mass      History of Present Illness   Sofia Hdz is an 34 year old female who presented with headaches but no visual changes to speak of and on radiographic finding found to have a suprasellar extended mass. She underwent a endoscopic endonasal transsphenoidal and transcavernous resection of suprasellar mass with Dr. Ybarra on 1/7/2025.     Hospital Course   Sofia Hdz was admitted on 1/7/2025.  The following problems were addressed during her hospitalization:    Active Problems:    Suprasellar mass    Assessment: as above    Plan: Discharge with nasal saline spray/irrigation up to 4 times per day. Follow up in Dr. Ospina's ENT clinic in 3 weeks and in neurosurgery clinic in 6 weeks post op. Our office will coordinate these follow ups.     Per hospitalist note 1/10/2025 plan to resume home medications at discharge. Continue to closely monitor blood glucose levels ad adjust as needed.     I have discussed the following assessment and plan with Dr. Ybarra who is in agreement with initial plan and will follow up with further consultation recommendations.    Lorin Burleson CNP  [facility] Neurosurgery  [place] [address]    Tel [phone]        Significant Results and Procedures   N/a    Pending Results   These results will be followed up by Dr. Ybarra  Unresulted Labs Ordered in the Past 30 Days of this Admission       Date and Time Order Name Status Description    1/10/2025  6:00 AM Sodium In process     1/7/2025  9:46 AM Surgical Pathology Exam Preliminary             Code Status   Full Code    Primary Care Physician   Twin REARDON  Habben    Physical Exam   Temp: 98.4  F (36.9  C) Temp src: Oral BP: 125/85 Pulse: 94   Resp: 18 SpO2: 96 % O2 Device: None (Room air)    Vitals:    01/07/25 0624 01/07/25 1547 01/08/25 0600   Weight: 94.8 kg (209 lb) 94.9 kg (209 lb 3.5 oz) 93.2 kg (205 lb 7.5 oz)     Vital Signs with Ranges  Temp:  [97.9  F (36.6  C)-98.4  F (36.9  C)] 98.4  F (36.9  C)  Pulse:  [94-98] 94  Resp:  [15-18] 18  BP: (125-145)/(85-92) 125/85  SpO2:  [96 %-97 %] 96 %  I/O last 3 completed shifts:  In: 440 [P.O.:440]  Out: 250 [Urine:250]    Constitutional: Awake, alert, cooperative, no apparent distress, and appears stated age.  Eyes: Lids and lashes normal, pupils equal, round and reactive to light, extra ocular muscles intact  ENT: Normocephalic, without obvious abnormality, atraumatic  Respiratory: No increased work of breathing  Skin: No bruising or bleeding, normal skin color, texture, turgor, no redness, warmth, or swelling.    Musculoskeletal: There is no redness, warmth, or swelling of the joints.  Full range of motion noted.  Motor strength is 5 out of 5 all extremities bilaterally.  Tone is normal.  Neurologic: Awake, alert, oriented to name, place and time.  Cranial nerves II-XII are grossly intact.  Motor is 5 out of 5 bilaterally.     Neuropsychiatric: Calm, normal eye contact, alert, normal affect, oriented to self, place, time and situation, memory for past and recent events intact and thought process normal.       Time Spent on this Encounter   I, Lorin Burleson NP, personally saw the patient today and spent less than or equal to 30 minutes discharging this patient.    Discharge Disposition   Discharged to home  Condition at discharge: Stable    Consultations This Hospital Stay   SPIRITUAL HEALTH SERVICES IP CONSULT  PHYSICAL THERAPY ADULT IP CONSULT  HOSPITALIST IP CONSULT    Discharge Orders      Physical Therapy  Referral      Occupational Therapy  Referral      Reason for your hospital  stay    Endoscopic endonasal resection of pituitary mass with stealth     Activity    Your activity upon discharge: Limit heavy lifting until follow up visit. Ok to walk as tolerated. No high impact activities until follow-up visit.     Follow Up    Your Neurosurgical follow-up appointments have been scheduled. You may call [phone] to make, confirm or change your appointment dates and/or times.     Wound care and dressings    Instructions to care for your wound at home: no straws, do not blow nose, continue nasal precautions. Monitor for any ongoing clear drainage     Diet    Follow this diet upon discharge: Regular     Hospital Follow-up with Existing Primary Care Provider (PCP)    Please see details below          Discharge Medications   Current Discharge Medication List        START taking these medications    Details   hydrocortisone (CORTEF) 10 MG tablet Take 2 tablets (20 mg) by mouth 2 times daily for 2 days, THEN 2 tablets (20 mg) daily for 2 days, THEN 1 tablet (10 mg) daily for 2 days.  Qty: 14 tablet, Refills: 0    Associated Diagnoses: Suprasellar mass      oxyCODONE (ROXICODONE) 5 MG tablet Take 1-2 tablets (5-10 mg) by mouth every 4 hours as needed for moderate pain.  Qty: 30 tablet, Refills: 0    Associated Diagnoses: Suprasellar mass      senna-docusate (SENOKOT-S/PERICOLACE) 8.6-50 MG tablet Take 1 tablet by mouth 2 times daily as needed for constipation.  Qty: 20 tablet, Refills: 0    Associated Diagnoses: Suprasellar mass      sodium chloride (OCEAN) 0.65 % nasal spray Spray 2 sprays in nostril 4 times daily.  Qty: 66 mL, Refills: 1    Associated Diagnoses: Suprasellar mass           CONTINUE these medications which have CHANGED    Details   methocarbamol (ROBAXIN) 500 MG tablet Take 1-2 tablets (500-1,000 mg) by mouth 3 times daily as needed for muscle spasms.  Qty: 30 tablet, Refills: 0    Associated Diagnoses: Suprasellar mass           CONTINUE these medications which have NOT CHANGED     Details   acetaminophen (TYLENOL) 500 MG tablet Take 1,000 mg by mouth      dapagliflozin (FARXIGA) 10 MG TABS tablet Take 1 tablet by mouth every morning      fluticasone (FLONASE) 50 MCG/ACT nasal spray Spray 2 sprays in nostril      !! gabapentin (NEURONTIN) 100 MG capsule Take 200 mg by mouth every morning. ([id number])      !! gabapentin (NEURONTIN) 300 MG capsule Take 600 mg by mouth at bedtime. ([id number])      glipiZIDE (GLUCOTROL) 10 MG tablet Take 10 mg by mouth 2 times daily (before meals).      insulin degludec (TRESIBA) 200 UNIT/ML pen Inject 90 Units Subcutaneous      levonorgestrel-ethinyl estradiol (NORDETTE) 0.15-30 MG-MCG tablet Take 1 tablet by mouth at bedtime      Semaglutide (RYBELSUS) 14 MG tablet Take 14 mg by mouth daily.       !! - Potential duplicate medications found. Please discuss with provider.        STOP taking these medications       chlorhexidine (HIBICLENS) 4 % solution Comments:   Reason for Stopping:         ibuprofen (ADVIL/MOTRIN) 200 MG tablet Comments:   Reason for Stopping:         traMADol (ULTRAM) 50 MG tablet Comments:   Reason for Stopping:             Allergies   Allergies   Allergen Reactions    Amoxicillin Rash    Metformin Diarrhea    Penicillins Rash     In childhood

## 2025-01-10 NOTE — PLAN OF CARE
"Goal Outcome Evaluation:      Plan of Care Reviewed With: patient    Overall Patient Progress: improving    Problem: Adult Inpatient Plan of Care  Goal: Plan of Care Review    Outcome: Progressing  Flowsheets (Taken 1/9/2025 2255)  Outcome Evaluation: A/Ox4, NVI. VSS on RA, pain at 3/10 after PRNs, up ad jaswinder. plan to dc tomorrow.  Plan of Care Reviewed With: patient  Overall Patient Progress: improving  Goal: Patient-Specific Goal (Individualized)  Description: You can add care plan individualizations to a care plan. Examples of Individualization might be:  \"Parent requests to be called daily at 9am for status\", \"I have a hard time hearing out of my right ear\", or \"Do not touch me to wake me up as it startles  me\".  Outcome: Progressing  Goal: Absence of Hospital-Acquired Illness or Injury  Outcome: Progressing  Intervention: Identify and Manage Fall Risk  Recent Flowsheet Documentation  Taken 1/9/2025 1930 by Terence Wheeler, RN  Safety Promotion/Fall Prevention:   safety round/check completed   assistive device/personal items within reach   clutter free environment maintained   increased rounding and observation   increase visualization of patient   lighting adjusted   nonskid shoes/slippers when out of bed   room door open   room near nurse's station   patient and family education   room organization consistent  Intervention: Prevent and Manage VTE (Venous Thromboembolism) Risk  Recent Flowsheet Documentation  Taken 1/9/2025 1930 by Terence Wheeler, RN  VTE Prevention/Management: (ambulatory, up in chair) SCDs off (sequential compression devices)  Goal: Optimal Comfort and Wellbeing  Outcome: Progressing  Intervention: Monitor Pain and Promote Comfort  Recent Flowsheet Documentation  Taken 1/9/2025 1923 by Terence Wheeler, RN  Pain Management Interventions: medication (see MAR)  Intervention: Provide Person-Centered Care  Recent Flowsheet Documentation  Taken 1/9/2025 1930 by Terence Wheeler, " RN  Trust Relationship/Rapport:   care explained   choices provided   reassurance provided   questions answered   emotional support provided   empathic listening provided   thoughts/feelings acknowledged   questions encouraged  Goal: Readiness for Transition of Care  Outcome: Progressing

## 2025-01-10 NOTE — PROGRESS NOTES
[facility]  Hospitalist Progress Note    Assessment & Plan   Sofia Hdz is a 34 year old female with PMH of history of C6-C7 arthroplasty, C4 partial laminectomy, diabetes mellitus type 2 on long-term insulin  who was admitted on 1/7/2025 for planned endoscopic endonasal transsphenoidal entrance cavernous resection of suprasellar mass with Dr. Ybarra on 1/7/25.     Hospitalist team was consulted for diabetes mgmt.        Suprasellar Mass s/p Endoscopic Endonasal Transsphenoidal Entrance Cavernous Resection of Suprasellar Mass on 1/7/25      - All post op care, pain management, DVT prophylaxis and discharge per Neurosurgery     Congestion/SOB/Cough  Patient started complaining of congestion and SOB on 1/10/25 morning. Not requiring oxygen. During rounds, she states that the SOB has resolved. She feels congested. Lungs are clear on exam. Sounds congested. Having some bloody drainage from her nose. Discussed that she is likely having some post nasal drip from her surgery. She also was intubated which could be causing some of her cough. Discussed CXR and nebs with patient. She is okay trying one neb. Would like to hold off on CXR which is reasonable given her lungs are clear and she is not on oxygen.     - Duoneb x 1 ordered     T2DM   PTA regimen includes Tresiba 90 units,, glipizide 10 mg twice daily, Rybelsus 14 mg daily, Farxiga 10 mg every morning. On 1/9/25, patient's sugars were in th 400s but her long acting insulin had not been started while be on a steroid taper. On 1/10/25 sugars had improved to the 200s with 40U of Treiba.     - Discussed with patient about home regimen and about potentially changing some of her doses given her sugars. Patient states that she prefers to go back to her home dose of meds. She states that she knows how to look for high and low numbers. She states that she knows how to manage both high and low numbers. Did also discussed with patient that she is on  "Hydrocortisone which can increase her sugars. She stated that she understood.     - Will increase Tresibea to 50U   - HDSSI  - 1: 10 carb count     - Plan to resume home meds at discharge       Clinically Significant Risk Factors         # Hyponatremia: Lowest Na = 134 mmol/L in last 2 days, will monitor as appropriate                     # DMII: A1C = 8.0 % (Ref range: <5.7 %) within past 6 months, PRESENT ON ADMISSION  # Obesity: Estimated body mass index is 38.82 kg/m  as calculated from the following:    Height as of this encounter: 1.549 m (5' 1\").    Weight as of this encounter: 93.2 kg (205 lb 7.5 oz)., PRESENT ON ADMISSION              Diet: Moderate Consistent Carb (60 g CHO per Meal) Diet  Diet     DVT Prophylaxis: Defer to primary service   Rangel Catheter: Not present  Lines: None     Cardiac Monitoring: None  Code Status: Full Code      Disposition Plan       Expected Discharge Date: 01/10/2025      Destination: home with family        Entered: Mikayla Ha MD 01/10/2025, 11:28 AM     Notes Reviewed: Neurosurgery    Family Updated: Spoke to  Goran via phone with patient on 1/10/25    Care Team Updated: Nursing updated     Disposition: Timing of discharge per Neurosurgery who is the primary service.     From a hospitalist standpoint, patient could discharge home. Discharge orders have been completed in the discharge navigator.     If patient continues to be admitted, will continue to follow along.       Medically Ready for Discharge: Anticipated Today        Mikayla Ha MD  Hospitalist Service   [facility]  Securely message with the [employer] Web Console (learn more here)         Medical Decision Making       60 MINUTES SPENT BY ME on the date of service doing chart review, history, exam, documentation & further activities per the note.           Interval History     No acute overnight events.     Patient started complaining of congestion and SOB on 1/10/25 " morning. Not requiring oxygen. During rounds, she states that the SOB has resolved. She feels congested. Lungs are clear on exam. Sounds congested. Having some bloody drainage from her nose.     -Data reviewed today: I reviewed all new labs and imaging results over the last 24 hours. I    Physical Exam   Temp: 98.4  F (36.9  C) Temp src: Oral BP: 125/85 Pulse: 94   Resp: 18 SpO2: 96 % O2 Device: None (Room air)    Vitals:    01/07/25 0624 01/07/25 1547 01/08/25 0600   Weight: 94.8 kg (209 lb) 94.9 kg (209 lb 3.5 oz) 93.2 kg (205 lb 7.5 oz)     Vital Signs with Ranges  Temp:  [97.9  F (36.6  C)-98.4  F (36.9  C)] 98.4  F (36.9  C)  Pulse:  [94-98] 94  Resp:  [15-18] 18  BP: (125-145)/(85-92) 125/85  SpO2:  [96 %-97 %] 96 %  I/O last 3 completed shifts:  In: 440 [P.O.:440]  Out: 250 [Urine:250]      Constitutional: Awake, alert, cooperative, no apparent distress.    HEENT: PERRL, Normocephalic, without obvious abnormality, atraumatic, oral pharynx with moist mucus membranes, congested with bloody drainage  Pulmonary: No increased work of breathing, good air exchange, clear to auscultation bilaterally, no crackles or wheezing.  Cardiovascular: Regular rate and rhythm, normal S1 and S2  GI: Normal bowel sounds, soft, non-distended, non-tender.  Skin/Integumen: Visualized skin appeared clear.  Neuro: CN II-XII grossly intact.  Psych:  Alert and oriented x 3.   Extremities: No lower extremity edema noted    Medications   Current Facility-Administered Medications   Medication Dose Route Frequency Provider Last Rate Last Admin     Current Facility-Administered Medications   Medication Dose Route Frequency Provider Last Rate Last Admin    acetaminophen (TYLENOL) tablet 975 mg  975 mg Oral Q8H Gulbranson, Lorin L, NP   975 mg at 01/10/25 0420    gabapentin (NEURONTIN) capsule 200 mg  200 mg Oral QAM Gulbranson, Lorin L, NP   200 mg at 01/10/25 0858    gabapentin (NEURONTIN) capsule 600 mg  600 mg Oral At Bedtime  Lorin Burleson NP   600 mg at 01/09/25 2126    hydrocortisone (CORTEF) tablet 40 mg  40 mg Oral BID Waleska Landers PA-C   40 mg at 01/10/25 0859    And    [START ON 1/11/2025] hydrocortisone (CORTEF) tablet 20 mg  20 mg Oral BID Anshul Waleska Garcia PA-C        And    [START ON 1/13/2025] hydrocortisone (CORTEF) tablet 20 mg  20 mg Oral Daily Waleska Landers PA-C        And    [START ON 1/15/2025] hydrocortisone (CORTEF) tablet 10 mg  10 mg Oral Daily Anshul Waleska Garcia PA-C        insulin aspart (NovoLOG) injection (RAPID ACTING)  1-10 Units Subcutaneous TID AC Joaquín Moses MD   3 Units at 01/10/25 0950    insulin aspart (NovoLOG) injection (RAPID ACTING)  1-7 Units Subcutaneous At Bedtime Joaquín Moses MD   6 Units at 01/09/25 2215    insulin aspart (NovoLOG) injection (RAPID ACTING)   Subcutaneous TID w/meals Joaquín Moses MD   6 Units at 01/10/25 0951    insulin degludec (TRESIBA) 100 UNIT/ML injection 50 Units  50 Units Subcutaneous At Bedtime Mikayla Ha MD        levonorgestrel-ethinyl estradiol (NORDETTE) 0.15-30 MG-MCG per tablet 1 tablet  1 tablet Oral At Bedtime Lorin Burleson NP   1 tablet at 01/09/25 2127    polyethylene glycol (MIRALAX) Packet 17 g  17 g Oral Daily Lorin Burleson NP        senna-docusate (SENOKOT-S/PERICOLACE) 8.6-50 MG per tablet 1 tablet  1 tablet Oral BID Lorin Burleson NP   1 tablet at 01/10/25 0859    sodium chloride (PF) 0.9% PF flush 3 mL  3 mL Intracatheter Q8H Lorin Burleson NP   3 mL at 01/10/25 0901       Data   Recent Labs   Lab 01/10/25  1116 01/10/25  0814 01/10/25  0754 01/10/25  0251 01/09/25  2337 01/09/25 2059 01/09/25  1848 01/07/25  1002 01/07/25  0621   NA  --   --  140  --  137  --  134*   < >  --    POTASSIUM  --   --   --   --   --   --   --   --  3.8   * 190*  --  228*  --    < >  --    < > 163*    < > = values in this interval not displayed.        No results found for this or any previous visit (from the past 24 hours).

## 2025-01-10 NOTE — PLAN OF CARE
Reason for Admission: POD 3 Endoscopic Endonasal Transsphenoidal and Transcavernous resection of suprasellar mass     Cognitive/Mentation: A/Ox 4  Neuros/CMS: Intact ex blurry vision on baseline, generalized weakness although all extremities are 5/5.   VS: VSS on RA. SBP <150   Tele: N/A.  /GI: Continent by bowel and bladder. FC removed yesterday, urinating well. Last BM PTA.   Pulmonary: LS clear. Nasal drainage post surgery. No c/o SOB/  Pain: 5-6/10 incisional pain. PRN Oxy given x2, PRN robaxin x1, and scheduled tylenol     Drains/Lines: R and L PIV SL  Skin: Abrasion on L ankle/shin, nostril incision  Activity: SBA.  Diet: Mod Carb diet with thin liquids. Takes pills whole.      Therapies recs: Home with assist  Discharge: Expected today     Aggression Stoplight Tool: Green     End of shift summary: Calm and cooperative with cares. Sleep interrupted by pain; pain meds provided and decreased stimuli promoted. On steroid tapering. On carb count and blood sugar checks with insulin ACHS.

## 2025-01-13 ENCOUNTER — TELEPHONE (OUTPATIENT)
Dept: OTOLARYNGOLOGY | Facility: CLINIC | Age: 35
End: 2025-01-13
Payer: COMMERCIAL

## 2025-01-13 NOTE — TELEPHONE ENCOUNTER
[facility] Call Center    Phone Message    May a detailed message be left on voicemail: yes     Reason for Call: Other: Per pt needs to schedule her post op appt, Please call to discuss. Thank you     Action Taken: Message routed to:  [facility] ENT    Travel Screening: Not Applicable     Date of Service:

## 2025-01-14 NOTE — TELEPHONE ENCOUNTER
Left Voicemail (1st Attempt) for the patient to call back and schedule the following:    Appointment Type: Return ENT  Provider: Dr. Wilmar Ospina   Appt date: CALLED TO OFFER 1/22/25  Specialty phone number: [phone]  Additional appointment(s) needed:   Additional Notes:

## 2025-01-21 ENCOUNTER — MYC MEDICAL ADVICE (OUTPATIENT)
Dept: NEUROSURGERY | Facility: CLINIC | Age: 35
End: 2025-01-21
Payer: COMMERCIAL

## 2025-01-22 ENCOUNTER — OFFICE VISIT (OUTPATIENT)
Dept: OTOLARYNGOLOGY | Facility: CLINIC | Age: 35
End: 2025-01-22
Payer: COMMERCIAL

## 2025-01-22 VITALS
HEART RATE: 91 BPM | BODY MASS INDEX: 37.48 KG/M2 | SYSTOLIC BLOOD PRESSURE: 116 MMHG | DIASTOLIC BLOOD PRESSURE: 78 MMHG | WEIGHT: 198.38 LBS

## 2025-01-22 DIAGNOSIS — J31.0 CHRONIC RHINITIS: ICD-10-CM

## 2025-01-22 DIAGNOSIS — D35.2 PITUITARY ADENOMA (H): Primary | ICD-10-CM

## 2025-01-22 ASSESSMENT — PAIN SCALES - GENERAL: PAINLEVEL_OUTOF10: MILD PAIN (3)

## 2025-01-22 NOTE — LETTER
1/22/2025       RE: Sofia Hdz  [id number] [place] [address]     Dear Colleague,    Thank you for referring your patient, Sofia Hdz, to the [facility] [place] at [facility]. Please see a copy of my visit note below.    Sofia Hdz is a 34-year-old female who on January 7 underwent transnasal endoscopic resection pituitary adenoma at [facility].  She is here today because of nasal splints that were placed in follow-up.    She denies headaches visual complaints but does have nasal congestion as expected.  She has no meningeal signs or symptoms.    Examination shows nasal splints in place.  These were easily removed using topical lidocaine showing no obvious infection cellulitis or CSF rhinorrhea.    Assessment: Stable status post transnasal endoscopic resection pituitary adenoma.    Plan: Either to follow-up with me in 1 month.  It is likely she will end up coming to [place] for that follow-up.      Again, thank you for allowing me to participate in the care of your patient.      Sincerely,    Wilmar Ospina MD

## 2025-01-22 NOTE — PATIENT INSTRUCTIONS
You were seen in the ENT Clinic today by Dr. Ospina. If you have any questions or concerns after your appointment, please contact us (see below)    The following has been recommended for you based upon your appointment today:  Follow-up with Dr. Ospina in [place].     How to Contact Us:  Send a [employer] message to your provider. Our team will respond to you via [employer]. Occasionally, we will need to call you to get further information.  For urgent matters (Monday-Friday), call the ENT Clinic: [phone] and speak with a call center team member - they will route your call appropriately.   If you'd like to speak directly with a nurse, please find our contact information below. We do our best to check voicemail frequently throughout the day, and will work to call you back within 1-2 days. For urgent matters, please use the general clinic phone numbers listed above.    Alisson SPAULDING RN, BSN   RN Care Coordinator, ENT Clinic  [facility]  Direct: [phone]  Shelly HURT LPN  Direct: [phone]

## 2025-01-22 NOTE — TELEPHONE ENCOUNTER
Faxed Updated return to work date January 22, 2025 to fax number [employer] [phone]    Right Fax confirmed at 3:33 PM    Anabel Delgado/

## 2025-01-22 NOTE — PROGRESS NOTES
Sofia Hdz is a 34-year-old female who on January 7 underwent transnasal endoscopic resection pituitary adenoma at [facility].  She is here today because of nasal splints that were placed in follow-up.    She denies headaches visual complaints but does have nasal congestion as expected.  She has no meningeal signs or symptoms.    Examination shows nasal splints in place.  These were easily removed using topical lidocaine showing no obvious infection cellulitis or CSF rhinorrhea.    Assessment: Stable status post transnasal endoscopic resection pituitary adenoma.    Plan: Either to follow-up with me in 1 month.  It is likely she will end up coming to [place] for that follow-up.

## 2025-02-20 ENCOUNTER — TELEPHONE (OUTPATIENT)
Dept: NEUROSURGERY | Facility: CLINIC | Age: 35
End: 2025-02-20

## 2025-02-20 ENCOUNTER — OFFICE VISIT (OUTPATIENT)
Dept: NEUROSURGERY | Facility: CLINIC | Age: 35
End: 2025-02-20
Payer: COMMERCIAL

## 2025-02-20 VITALS — OXYGEN SATURATION: 97 % | SYSTOLIC BLOOD PRESSURE: 118 MMHG | HEART RATE: 87 BPM | DIASTOLIC BLOOD PRESSURE: 76 MMHG

## 2025-02-20 DIAGNOSIS — G93.89 SUPRASELLAR MASS: Primary | ICD-10-CM

## 2025-02-20 ASSESSMENT — PAIN SCALES - GENERAL: PAINLEVEL_OUTOF10: SEVERE PAIN (10)

## 2025-02-20 NOTE — LETTER
2/20/2025      Sofia Hdz  [id number] [address]      Dear Colleague,    Thank you for referring your patient, Sofia Hdz, to the [facility]UROLOGICAL [facility] [place]. Please see a copy of my visit note below.    [facility] Neurosurgery Clinic   Follow Up Visit      HPI: Sofia Hdz is a 34 year old female who presents for post op follow-up. 6 weeks s/p endoscopic endonasal transsphenoidal and transcavernous resection of suprasellar mass on 1/7/25 with Dr. Ybarra.     Patient reports increased fatigue and headaches that developed 4 days ago, possibly related to increased activity at work. She also reports two episodes of clear nasal drainage that occurred 2 days ago. She describes continued blurry vision, similar to pre op. She is following with ENT Dr. Ospina, nasal splints were removed on 1/22/25. She continues to use nasal saline spray as advised.      Past Medical History:   Diagnosis Date     Cervical radiculopathy      Cervical spinal stenosis      Cervical spondylosis      Chronic ulcer of left lower extremity with fat layer exposed (H)      Diabetes mellitus (H)      Limb pain      Neck pain      Necrobiosis lipoidica diabeticorum (H)      Obesity      Prolactinoma (H)      Pyoderma gangrenosum (H)          Past Surgical History:   Procedure Laterality Date     C4-5 FORAMINOTOMY AND  DECOMPRESSION Bilateral 05/2023     CERVICAL DISCECTOMY       IR LUMBAR PUNCTURE  02/13/2023     OPTICAL TRACKING SYSTEM ENDOSCOPIC ENDONASAL SURGERY N/A 1/7/2025    Procedure: Endoscopic endonasal resection of pituitary mass with stealth;  Surgeon: Oscar Ybarra MD;  Location:  OR       Current Outpatient Medications   Medication Sig Dispense Refill     acetaminophen (TYLENOL) 500 MG tablet Take 1,000 mg by mouth       dapagliflozin (FARXIGA) 10 MG TABS tablet Take 1 tablet by mouth every morning       fluticasone (FLONASE) 50 MCG/ACT nasal spray Spray 2 sprays in  nostril       gabapentin (NEURONTIN) 100 MG capsule Take 200 mg by mouth every morning. ([id number]=272ml)       gabapentin (NEURONTIN) 300 MG capsule Take 600 mg by mouth at bedtime. ([id number]=469fm)       glipiZIDE (GLUCOTROL) 10 MG tablet Take 10 mg by mouth 2 times daily (before meals).       insulin degludec (TRESIBA) 200 UNIT/ML pen Inject 90 Units Subcutaneous       levonorgestrel-ethinyl estradiol (NORDETTE) 0.15-30 MG-MCG tablet Take 1 tablet by mouth at bedtime       methocarbamol (ROBAXIN) 500 MG tablet Take 1-2 tablets (500-1,000 mg) by mouth 3 times daily as needed for muscle spasms. 30 tablet 0     oxyCODONE (ROXICODONE) 5 MG tablet Take 1-2 tablets (5-10 mg) by mouth every 4 hours as needed for moderate pain. 30 tablet 0     Semaglutide (RYBELSUS) 14 MG tablet Take 14 mg by mouth daily.       senna-docusate (SENOKOT-S/PERICOLACE) 8.6-50 MG tablet Take 1 tablet by mouth 2 times daily as needed for constipation. 20 tablet 0     sodium chloride (OCEAN) 0.65 % nasal spray Spray 2 sprays in nostril 4 times daily. 66 mL 1     hydrocortisone (CORTEF) 10 MG tablet Take 2 tablets (20 mg) by mouth 2 times daily for 2 days, THEN 2 tablets (20 mg) daily for 2 days, THEN 1 tablet (10 mg) daily for 2 days. 14 tablet 0     No current facility-administered medications for this visit.       Allergies   Allergen Reactions     Amoxicillin Rash     Metformin Diarrhea     Penicillins Rash     In childhood       Social History     Socioeconomic History     Marital status:    Tobacco Use     Smoking status: Never     Passive exposure: Current     Smokeless tobacco: Never   Vaping Use     Vaping status: Never Used   Substance and Sexual Activity     Alcohol use: Yes     Comment: rare     Drug use: Never     Social Drivers of Health     Financial Resource Strain: Low Risk  (12/29/2024)    [facility]    Overall Financial Resource Strain (CARDIA)      Difficulty of Paying Living Expenses:  Not very hard   Food Insecurity: No Food Insecurity (12/29/2024)    Received from [facility]    Hunger Vital Sign      Worried About Running Out of Food in the Last Year: Never true      Ran Out of Food in the Last Year: Never true   Transportation Needs: No Transportation Needs (12/29/2024)    Received from [facility]    Transportation Needs      In the past 12 months, has lack of transportation kept you from medical appointments, meetings, work, or from getting medicines or things needed for daily living?: No   Physical Activity: Insufficiently Active (1/31/2024)    Received from [facility] [employer], [facility]    Exercise Vital Sign      Days of Exercise per Week: 1 day      Minutes of Exercise per Session: 60 min   Stress: No Stress Concern Present (12/29/2024)    Received from [facility]    [employer] [place] [employer] - Occupational Stress Questionnaire      Feeling of Stress : Only a little   Social Connections: Unknown (12/29/2024)    Received from [facility]    Social Connection and Isolation Panel [NHANES]      Frequency of Communication with Friends and Family: Three times a week      Frequency of Social Gatherings with Friends and Family: Twice a week      Attends [employer] Services: Patient declined      Active Member of Clubs or Organizations: Yes      Attends Club or Organization Meetings: 1 to 4 times per year      Marital Status:    Interpersonal Safety: Low Risk  (1/7/2025)    Interpersonal Safety      Do you feel physically and emotionally safe where you currently live?: Yes      Within the past 12 months, have you been hit, slapped, kicked or otherwise physically hurt by someone?: No      Within the past 12 months, have you been humiliated or emotionally abused in other ways by your partner or ex-partner?: No   Housing Stability: Low Risk  (12/29/2024)     [facility] from [facility]    Housing Stability Vital Sign      Unable to Pay for Housing in the Last Year: No      Number of Times Moved in the Last Year: 0      Homeless in the Last Year: No     No family history on file.    ROS: 10 point ROS neg other than the symptoms noted above in the HPI.    Vital Signs: /76   Pulse 87   LMP 12/30/2024 (Approximate)   SpO2 97%     Examination:  Constitutional:  Alert, well nourished, NAD.  HEENT: Normocephalic, atraumatic.   Pulm:  Without shortness of breath or audible respiratory sounds.  CV:  No pitting edema of BLE.      Neurological:  Awake  Alert  Oriented x 3  Speech clear  Cranial nerves II - XII intact  PERRL  EOMI  Face symmetric  Tongue midline  Motor exam: 5/5 strength in all four extremities   Sensation intact   Finger to Nose smooth   Pronator drift negative   Gait: Able to stand from a seated position. Normal non-antalgic, non-myelopathic gait.  No nasal drainage noted.     Assessment:  6 weeks s/p endoscopic endonasal transsphenoidal and transcavernous resection of suprasellar mass on 1/7/25 with Dr. Ybarra.     Plan:   -Continue with post op care plan  -Monitor symptoms  -Pain control measures as needed   -Advised patient to call our clinic if she develops any new or worsening symptoms or nasal drainage   -Follow up with Dr. Ospina on 2/26 as scheduled   -Follow up with Dr. Ybarra in 6 weeks with MRI prior to appt     Advised patient to seek medical attention if she develops any red flag symptoms.   Patient voiced understanding and agreement.      Geri Serrano CNP  [facility] Neurosurgery  Clinic phone number: [phone]  Securely message or page via [employer] Secure Chat, [employer], or [employer]       Again, thank you for allowing me to participate in the care of your patient.        Sincerely,        Geri Serrano NP    Electronically signed

## 2025-02-20 NOTE — TELEPHONE ENCOUNTER
Left Voicemail (1st Attempt) for the patient to call back and schedule the following:    Location: Imaging  Provider: Amada  Appointment type: MR Brain w/o & w Contrast   Return date: prior to 4/17    Phone number: [phone]    Additional Notes: patient to obtain MRI prior to follow up 4/17 with Dr. Ybarra

## 2025-02-20 NOTE — NURSING NOTE
"Sofia Hdz is a 34 year old female who presents for:  Chief Complaint   Patient presents with    RECHECK     6 WK F/U. DOS: 1/7/25.  Endoscopic endonasal resection of pituitary mass          Initial Vitals:  /76   Pulse 87   LMP 12/30/2024 (Approximate)   SpO2 97%  Estimated body mass index is 37.48 kg/m  as calculated from the following:    Height as of 1/7/25: 5' 1\" (1.549 m).    Weight as of 1/22/25: 198 lb 6 oz (90 kg).. There is no height or weight on file to calculate BSA. BP completed using cuff size: large  Severe Pain (10)    Nursing Comments:       Anabel Delgado    "

## 2025-02-20 NOTE — PROGRESS NOTES
[facility]   Follow Up Visit      HPI: Sofia Hdz is a 34 year old female who presents for post op follow-up. 6 weeks s/p endoscopic endonasal transsphenoidal and transcavernous resection of suprasellar mass on 1/7/25 with Dr. Ybarra.     Patient reports increased fatigue and headaches that developed 4 days ago, possibly related to increased activity at work. She also reports two episodes of clear nasal drainage that occurred 2 days ago. She describes continued blurry vision, similar to pre op. She is following with ENT Dr. Ospina, nasal splints were removed on 1/22/25. She continues to use nasal saline spray as advised.      Past Medical History:   Diagnosis Date    Cervical radiculopathy     Cervical spinal stenosis     Cervical spondylosis     Chronic ulcer of left lower extremity with fat layer exposed (H)     Diabetes mellitus (H)     Limb pain     Neck pain     Necrobiosis lipoidica diabeticorum (H)     Obesity     Prolactinoma (H)     Pyoderma gangrenosum (H)          Past Surgical History:   Procedure Laterality Date    C4-5 FORAMINOTOMY AND  DECOMPRESSION Bilateral 05/2023    CERVICAL DISCECTOMY      IR LUMBAR PUNCTURE  02/13/2023    OPTICAL TRACKING SYSTEM ENDOSCOPIC ENDONASAL SURGERY N/A 1/7/2025    Procedure: Endoscopic endonasal resection of pituitary mass with stealth;  Surgeon: Oscar Ybarra MD;  Location:  OR       Current Outpatient Medications   Medication Sig Dispense Refill    acetaminophen (TYLENOL) 500 MG tablet Take 1,000 mg by mouth      dapagliflozin (FARXIGA) 10 MG TABS tablet Take 1 tablet by mouth every morning      fluticasone (FLONASE) 50 MCG/ACT nasal spray Spray 2 sprays in nostril      gabapentin (NEURONTIN) 100 MG capsule Take 200 mg by mouth every morning. ([id number])      gabapentin (NEURONTIN) 300 MG capsule Take 600 mg by mouth at bedtime. ([id number]=801hd)      glipiZIDE (GLUCOTROL) 10 MG tablet Take 10 mg by mouth 2 times daily (before  meals).      insulin degludec (TRESIBA) 200 UNIT/ML pen Inject 90 Units Subcutaneous      levonorgestrel-ethinyl estradiol (NORDETTE) 0.15-30 MG-MCG tablet Take 1 tablet by mouth at bedtime      methocarbamol (ROBAXIN) 500 MG tablet Take 1-2 tablets (500-1,000 mg) by mouth 3 times daily as needed for muscle spasms. 30 tablet 0    oxyCODONE (ROXICODONE) 5 MG tablet Take 1-2 tablets (5-10 mg) by mouth every 4 hours as needed for moderate pain. 30 tablet 0    Semaglutide (RYBELSUS) 14 MG tablet Take 14 mg by mouth daily.      senna-docusate (SENOKOT-S/PERICOLACE) 8.6-50 MG tablet Take 1 tablet by mouth 2 times daily as needed for constipation. 20 tablet 0    sodium chloride (OCEAN) 0.65 % nasal spray Spray 2 sprays in nostril 4 times daily. 66 mL 1    hydrocortisone (CORTEF) 10 MG tablet Take 2 tablets (20 mg) by mouth 2 times daily for 2 days, THEN 2 tablets (20 mg) daily for 2 days, THEN 1 tablet (10 mg) daily for 2 days. 14 tablet 0     No current facility-administered medications for this visit.       Allergies   Allergen Reactions    Amoxicillin Rash    Metformin Diarrhea    Penicillins Rash     In childhood       Social History     Socioeconomic History    Marital status:    Tobacco Use    Smoking status: Never     Passive exposure: Current    Smokeless tobacco: Never   Vaping Use    Vaping status: Never Used   Substance and Sexual Activity    Alcohol use: Yes     Comment: rare    Drug use: Never     Social Drivers of Health     Financial Resource Strain: Low Risk  (12/29/2024)    Received from [employer] [facility] [employer]    Overall Financial Resource Strain (CARDIA)     Difficulty of Paying Living Expenses: Not very hard   Food Insecurity: No Food Insecurity (12/29/2024)    Received from [employer] [facility] [employer]    Hunger Vital Sign     Worried About Running Out of Food in the Last Year: Never true     Ran Out of Food in the Last Year: Never true   Transportation Needs: No Transportation  Needs (12/29/2024)    Received from [facility] [employer] [facility]    Transportation Needs     In the past 12 months, has lack of transportation kept you from medical appointments, meetings, work, or from getting medicines or things needed for daily living?: No   Physical Activity: Insufficiently Active (1/31/2024)    Received from [facility] [employer] [facility], [facility]    Exercise Vital Sign     Days of Exercise per Week: 1 day     Minutes of Exercise per Session: 60 min   Stress: No Stress Concern Present (12/29/2024)    Received from [facility] [employer] [facility]    [place] [employer] - Occupational Stress Questionnaire     Feeling of Stress : Only a little   Social Connections: Unknown (12/29/2024)    Received from [facility] [employer] [facility]    Social Connection and Isolation Panel [NHANES]     Frequency of Communication with Friends and Family: Three times a week     Frequency of Social Gatherings with Friends and Family: Twice a week     Attends Druze Services: Patient declined     Active Member of Clubs or Organizations: Yes     Attends Club or Organization Meetings: 1 to 4 times per year     Marital Status:    Interpersonal Safety: Low Risk  (1/7/2025)    Interpersonal Safety     Do you feel physically and emotionally safe where you currently live?: Yes     Within the past 12 months, have you been hit, slapped, kicked or otherwise physically hurt by someone?: No     Within the past 12 months, have you been humiliated or emotionally abused in other ways by your partner or ex-partner?: No   Housing Stability: Low Risk  (12/29/2024)    Received from [facility] [employer] [facility]    Housing Stability Vital Sign     Unable to Pay for Housing in the Last Year: No     Number of Times Moved in the Last Year: 0     Homeless in the Last Year: No     No family history on file.    ROS: 10 point ROS neg other than the symptoms noted above in the  HPI.    Vital Signs: /76   Pulse 87   LMP 12/30/2024 (Approximate)   SpO2 97%     Examination:  Constitutional:  Alert, well nourished, NAD.  HEENT: Normocephalic, atraumatic.   Pulm:  Without shortness of breath or audible respiratory sounds.  CV:  No pitting edema of BLE.      Neurological:  Awake  Alert  Oriented x 3  Speech clear  Cranial nerves II - XII intact  PERRL  EOMI  Face symmetric  Tongue midline  Motor exam: 5/5 strength in all four extremities   Sensation intact   Finger to Nose smooth   Pronator drift negative   Gait: Able to stand from a seated position. Normal non-antalgic, non-myelopathic gait.  No nasal drainage noted.     Assessment:  6 weeks s/p endoscopic endonasal transsphenoidal and transcavernous resection of suprasellar mass on 1/7/25 with Dr. Ybarra.     Plan:   -Continue with post op care plan  -Monitor symptoms  -Pain control measures as needed   -Advised patient to call our clinic if she develops any new or worsening symptoms or nasal drainage   -Follow up with Dr. Ospina on 2/26 as scheduled   -Follow up with Dr. Ybarra in 6 weeks with MRI prior to appt     Advised patient to seek medical attention if she develops any red flag symptoms.   Patient voiced understanding and agreement.      Geir Serrano [facility]  Clinic phone number: [phone]  Securely message or page via Epic Secure Chat, [email or web address], or [employer]

## 2025-02-26 ENCOUNTER — OFFICE VISIT (OUTPATIENT)
Dept: OTOLARYNGOLOGY | Facility: CLINIC | Age: 35
End: 2025-02-26
Payer: COMMERCIAL

## 2025-02-26 VITALS
BODY MASS INDEX: 37.79 KG/M2 | SYSTOLIC BLOOD PRESSURE: 116 MMHG | DIASTOLIC BLOOD PRESSURE: 73 MMHG | WEIGHT: 200 LBS | HEART RATE: 89 BPM

## 2025-02-26 DIAGNOSIS — J31.0 CHRONIC RHINITIS: Primary | ICD-10-CM

## 2025-02-26 DIAGNOSIS — D35.2 PITUITARY ADENOMA (H): ICD-10-CM

## 2025-02-26 PROCEDURE — 99024 POSTOP FOLLOW-UP VISIT: CPT | Performed by: OTOLARYNGOLOGY

## 2025-02-26 PROCEDURE — 3074F SYST BP LT 130 MM HG: CPT | Performed by: OTOLARYNGOLOGY

## 2025-02-26 PROCEDURE — 1126F AMNT PAIN NOTED NONE PRSNT: CPT | Performed by: OTOLARYNGOLOGY

## 2025-02-26 PROCEDURE — 3078F DIAST BP <80 MM HG: CPT | Performed by: OTOLARYNGOLOGY

## 2025-02-26 ASSESSMENT — PAIN SCALES - GENERAL: PAINLEVEL_OUTOF10: NO PAIN (0)

## 2025-02-26 NOTE — LETTER
2/26/2025       RE: Sofia Hdz  [id number] [address] [facility] [address]     Dear Colleague,    Thank you for referring your patient, Sofia Hdz, to the [facility] [place] at [facility]. Please see a copy of my visit note below.    Sofia Hdz returns to clinic for routine nasal cleaning.  She is status post transnasal endoscopic resection pituitary adenoma on January 7 of this year.  She states that she has no headache she has mild congestion and is slowly returning taste and smell.    Examination shows excellent clearance to the nasal airways bilaterally there is no rhinorrhea discolored otherwise.    Assessment: Stable status post transnasal endoscopic resection pituitary adenoma.    Plan: Continue use nasal saline.  Follow-up with me as needed.      Again, thank you for allowing me to participate in the care of your patient.      Sincerely,    Wilmar Ospina MD

## 2025-02-26 NOTE — PROGRESS NOTES
Sofia Hdz returns to clinic for routine nasal cleaning.  She is status post transnasal endoscopic resection pituitary adenoma on January 7 of this year.  She states that she has no headache she has mild congestion and is slowly returning taste and smell.    Examination shows excellent clearance to the nasal airways bilaterally there is no rhinorrhea discolored otherwise.    Assessment: Stable status post transnasal endoscopic resection pituitary adenoma.    Plan: Continue use nasal saline.  Follow-up with me as needed.

## 2025-02-26 NOTE — PATIENT INSTRUCTIONS
You were seen in the ENT Clinic today by Dr. Ospina. If you have any questions or concerns after your appointment, please contact us (see below)      2.   Please return to clinic as needed.         How to Contact Us:  Send a [employer] message to your provider. Our team will respond to you via [employer]. Occasionally, we will need to call you to get further information.  For urgent matters (Monday-Friday), call the ENT Clinic: [phone] and speak with a call center team member - they will route your call appropriately.   If you'd like to speak directly with a nurse, please find our contact information below. We do our best to check voicemail frequently throughout the day, and will work to call you back within 1-2 days. For urgent matters, please use the general clinic phone numbers listed above.      Alisson MARSH RN  ENT RN Care Coordinator  Direct: [phone]    Shelly JCAKSON LPN  Direct: [phone]         
63

## 2025-04-14 ENCOUNTER — HOSPITAL ENCOUNTER (OUTPATIENT)
Dept: MRI IMAGING | Facility: CLINIC | Age: 35
Discharge: HOME | End: 2025-04-14
Attending: NURSE PRACTITIONER | Admitting: NURSE PRACTITIONER
Payer: COMMERCIAL

## 2025-04-14 DIAGNOSIS — G93.89 SUPRASELLAR MASS: ICD-10-CM

## 2025-04-14 PROCEDURE — 70553 MRI BRAIN STEM W/O & W/DYE: CPT

## 2025-04-14 PROCEDURE — A9585 GADOBUTROL INJECTION: HCPCS | Performed by: NURSE PRACTITIONER

## 2025-04-14 PROCEDURE — 255N000002 HC RX 255 OP 636: Performed by: NURSE PRACTITIONER

## 2025-04-14 RX ORDER — GADOBUTROL 604.72 MG/ML
10 INJECTION INTRAVENOUS ONCE
Status: COMPLETED | OUTPATIENT
Start: 2025-04-14 | End: 2025-04-14

## 2025-04-14 RX ADMIN — GADOBUTROL 10 ML: 604.72 INJECTION INTRAVENOUS at 12:48

## 2025-04-17 ENCOUNTER — OFFICE VISIT (OUTPATIENT)
Dept: NEUROSURGERY | Facility: CLINIC | Age: 35
End: 2025-04-17
Payer: COMMERCIAL

## 2025-04-17 VITALS — OXYGEN SATURATION: 97 % | SYSTOLIC BLOOD PRESSURE: 108 MMHG | HEART RATE: 82 BPM | DIASTOLIC BLOOD PRESSURE: 73 MMHG

## 2025-04-17 DIAGNOSIS — G93.89 SUPRASELLAR MASS: Primary | ICD-10-CM

## 2025-04-17 NOTE — PATIENT INSTRUCTIONS
Ordered a repeat MRI in 6 months. They will call to schedule. If the don't call within 2 days, call [place]: [phone]  [phone]       [place]: [phone]     Please schedule within 2 weeks. Once your imaging exam has been scheduled, our prior authorization team will connect with your insurance to ensure coverage of the exam. They will only reach out to you if a prior authorization is denied.  Please schedule your imaging exam at least 7 days out so our team has time to complete this process.  Failure to do so could result in insurance denial and you becoming responsible for the cost of the exam.   We will call you with the results. If you don't hear from us 7 days after the scan, please call us.      [facility]  [facility] - [address] [address]  Telephone:  [phone]        Fax:  [phone]

## 2025-04-17 NOTE — LETTER
4/17/2025      Sofia Hdz  [address] [address] [facility] [address]      Dear Colleague,    Thank you for referring your patient, Sofia Hdz, to the [facility]. Please see a copy of my visit note below.    Doing well 3 mos post-op.  Some mild fatigue and intermittent frontal headaches.  MR Brain, personally reviewed, with interval removal of the bulk of the prior mass, decompression of the optic apparatus, small area of non-enhancing tissue in the upper right cavernous sinus, possible residual.       Physical Exam  /73   Pulse 82   SpO2 97%     Mental status:  Alert and Oriented x 3, speech is fluent.  HEENT:  PERRL.  EOM s intact.  Visual fields full to gross exam  Cranial nerves:  II-XII intact.   Motor:    Shoulder Abduction:  Right:  5/5   Left:  5/5  Biceps:                      Right:  5/5   Left:  5/5  Triceps:                     Right:  5/5   Left:  5/5  Interosseus :            Right:  5/5   Left:  5/5  Hip Flexor:                Right: 5/5  Left:  5/5  Quadriceps:             Right:  5/5  Left:  5/5  Hamstrings:             Right:  5/5  Left:  5/5  Gastroc Soleus:        Right:  5/5  Left:  5/5  Tib/Ant:                      Right:  5/5  Left:  5/5  EHL:                     Right:  5/5  Left:  5/5  Sensation:  Intact  Smooth tandem walking.  Incision well healed    A/P:  Repeat MRI in 6 months       Again, thank you for allowing me to participate in the care of your patient.        Sincerely,        Oscar Ybarra MD    Electronically signed

## 2025-04-17 NOTE — PROGRESS NOTES
Doing well 3 mos post-op.  Some mild fatigue and intermittent frontal headaches.  MR Brain, personally reviewed, with interval removal of the bulk of the prior mass, decompression of the optic apparatus, small area of non-enhancing tissue in the upper right cavernous sinus, possible residual.       Physical Exam  /73   Pulse 82   SpO2 97%     Mental status:  Alert and Oriented x 3, speech is fluent.  HEENT:  PERRL.  EOM s intact.  Visual fields full to gross exam  Cranial nerves:  II-XII intact.   Motor:    Shoulder Abduction:  Right:  5/5   Left:  5/5  Biceps:                      Right:  5/5   Left:  5/5  Triceps:                     Right:  5/5   Left:  5/5  Interosseus :            Right:  5/5   Left:  5/5  Hip Flexor:                Right: 5/5  Left:  5/5  Quadriceps:             Right:  5/5  Left:  5/5  Hamstrings:             Right:  5/5  Left:  5/5  Gastroc Soleus:        Right:  5/5  Left:  5/5  Tib/Ant:                      Right:  5/5  Left:  5/5  EHL:                     Right:  5/5  Left:  5/5  Sensation:  Intact  Smooth tandem walking.  Incision well healed    A/P:  Repeat MRI in 6 months

## 2025-04-17 NOTE — NURSING NOTE
"Sofia Hdz is a 34 year old female who presents for:  Chief Complaint   Patient presents with    Surgical Followup     MR 4/14 12 WK F/U. DOS: 1/7/25.  Endoscopic endonasal resection of pituitary mass            Initial Vitals:  /73   Pulse 82   SpO2 97%  Estimated body mass index is 37.79 kg/m  as calculated from the following:    Height as of 1/7/25: 5' 1\" (1.549 m).    Weight as of 2/26/25: 200 lb (90.7 kg).. There is no height or weight on file to calculate BSA. BP completed using cuff size: large  Data Unavailable    Nursing Comments:     Germaine Hopper     "

## 2025-04-26 ENCOUNTER — HEALTH MAINTENANCE LETTER (OUTPATIENT)
Age: 35
End: 2025-04-26

## 2025-04-29 ENCOUNTER — OFFICE VISIT (OUTPATIENT)
Dept: OPHTHALMOLOGY | Facility: CLINIC | Age: 35
End: 2025-04-29
Attending: OPHTHALMOLOGY
Payer: COMMERCIAL

## 2025-04-29 DIAGNOSIS — H47.20 OPTIC ATROPHY: ICD-10-CM

## 2025-04-29 DIAGNOSIS — D49.7 PITUITARY TUMOR: Primary | ICD-10-CM

## 2025-04-29 PROCEDURE — 99214 OFFICE O/P EST MOD 30 MIN: CPT | Performed by: OPHTHALMOLOGY

## 2025-04-29 PROCEDURE — 92083 EXTENDED VISUAL FIELD XM: CPT | Performed by: OPHTHALMOLOGY

## 2025-04-29 PROCEDURE — 92133 CPTRZD OPH DX IMG PST SGM ON: CPT | Performed by: OPHTHALMOLOGY

## 2025-04-29 ASSESSMENT — CONF VISUAL FIELD
OS_INFERIOR_TEMPORAL_RESTRICTION: 0
OS_SUPERIOR_TEMPORAL_RESTRICTION: 0
OD_SUPERIOR_TEMPORAL_RESTRICTION: 0
OS_NORMAL: 1
OD_SUPERIOR_NASAL_RESTRICTION: 0
OD_INFERIOR_NASAL_RESTRICTION: 0
OS_SUPERIOR_NASAL_RESTRICTION: 0
METHOD: COUNTING FINGERS
OD_NORMAL: 1
OS_INFERIOR_NASAL_RESTRICTION: 0
OD_INFERIOR_TEMPORAL_RESTRICTION: 0

## 2025-04-29 ASSESSMENT — TONOMETRY
IOP_METHOD: ICARE
OS_IOP_MMHG: 15
OD_IOP_MMHG: 14

## 2025-04-29 ASSESSMENT — SLIT LAMP EXAM - LIDS
COMMENTS: NORMAL
COMMENTS: NORMAL

## 2025-04-29 ASSESSMENT — CUP TO DISC RATIO
OS_RATIO: 0.2
OD_RATIO: 0.15

## 2025-04-29 ASSESSMENT — EXTERNAL EXAM - LEFT EYE: OS_EXAM: NORMAL

## 2025-04-29 ASSESSMENT — VISUAL ACUITY
OD_CC: 20/20
OS_CC: 20/20
CORRECTION_TYPE: GLASSES

## 2025-04-29 ASSESSMENT — EXTERNAL EXAM - RIGHT EYE: OD_EXAM: NORMAL

## 2025-04-29 ASSESSMENT — REFRACTION_MANIFEST
OS_SPHERE: -1.25
OD_SPHERE: -1.75
OD_CYLINDER: SPHERE
OS_CYLINDER: SPHERE

## 2025-04-29 NOTE — LETTER
2025     RE:  :  MRN: Sofia Hdz  1990  [id number]     Dear Dr. Ybarra:     Your patient,Sofia Hdz, returned for neuro-ophthalmic follow up. My assessment and plan are below.  For further details, please see my attached clinic note.      Sofia Hdz is a 34 year old female with the following diagnoses:   1. Pituitary tumor    2. Optic atrophy       Patient is a 34-year-old with a history of pituitary mass with mildly elevated prolactin level.  Patient was placed on cabergoline.     Her last exam was in 2024, on that visit there was some mild changes in both optic nerves and relatively normal visual fields in both eyes.  The plan was to repeat her MRI and OCT in 3 months.     Patient did not want to be on cabergoline and therefore stopped it after a month.  She then had surgery on 2025.  Histopathology results are as follows:         Patient was told that about 90% of the mass was removed during surgery.  Patient has another MRI scheduled in 6 months.  Patient did not feel any changes in her vision in either eye, still feels some blurry vision.      Exam:  Visual acuity 20/20 right eye 20/20 left eye.  Color vision is full in both eyes.  Pupils: no APD, round and reactive.  Intraocular pressure 14 right eye and 15 left eye.      Anterior segment exam unremarkalbe.  Fundus exam unremarkable.  Strabismus exam unremarkable    Tests ordered and interpreted today:      HVF 24-2 MIKE FAST OU          Performed by: Aura   . Patient cooperation: Reliable   .   Good Fixation, Dilated After VF.     Right Eye  Reliability of the test: Good   . Findings: Visual fields normal   . Interpretation: Normal   . Plan: Monitor   . Interval: Same   .     Left Eye  Reliability of the test: Fair   . Findings: Visual fields normal   . Interpretation: Normal   . Plan: Monitor   . Interval: Same   .          OCT Optic Nerve RNFL Spectralis OU (both eyes)          Performed by: YOLANDE Lee      Right Eye  Reliability of the test: Good   . Test Findings: Normal   . Average Thickness Value: 107   . Plan: Monitor   . Interval: Initial   .     Left Eye  Reliability of the test: Good   . Test Findings: Normal   . Average Thickness Value: 107   . Plan: Monitor   . Interval: Initial   .              Patient follows for pituitary adenoma now status post surgery in January 2025.  Since surgery patient has not felt any changes in her vision.  Patient was told that 90% of the pituitary mass was removed during surgery.  I reviewed her most recent MRI and there is no compression of the chiasm.    Follow up with me in 1 year or as needed.            Again, thank you for allowing me to participate in the care of your patient.      Sincerely,      Jay Garcia MD  Professor  Director of Neuro-Ophthalmology  [facility] [facility] Endowed Chair  Departments of Ophthalmology, Neurology, and Neurosurgery  [facility] [address] [place] [address]  [address]  T - [phone]  F - [phone]  FLAVIA [email or web address]      CC: Oscar Ybarra MD  [id number] [place]  [address] [address]  Via In Basket

## 2025-04-29 NOTE — PROGRESS NOTES
Sofia Hdz is a 34 year old female with the following diagnoses:   1. Pituitary tumor    2. Optic atrophy       Patient is a 34-year-old with a history of pituitary mass with mildly elevated prolactin level.  Patient was placed on cabergoline.     Her last exam was in October 2024, on that visit there was some mild changes in both optic nerves and relatively normal visual fields in both eyes.  The plan was to repeat her MRI and OCT in 3 months.     Patient did not want to be on cabergoline and therefore stopped it after a month.  She then had surgery on January 7, 2025.  Histopathology results are as follows:         Patient was told that about 90% of the mass was removed during surgery.  Patient has another MRI scheduled in 6 months.  Patient did not feel any changes in her vision in either eye, still feels some blurry vision.      Exam:  Visual acuity 20/20 right eye 20/20 left eye.  Color vision is full in both eyes.  Pupils: no APD, round and reactive.  Intraocular pressure 14 right eye and 15 left eye.      Anterior segment exam unremarkalbe.  Fundus exam unremarkable.  Strabismus exam unremarkable    Tests ordered and interpreted today:      HVF 24-2 MIKE FAST OU          Performed by: Aura   . Patient cooperation: Reliable   .   Good Fixation, Dilated After VF.     Right Eye  Reliability of the test: Good   . Findings: Visual fields normal   . Interpretation: Normal   . Plan: Monitor   . Interval: Same   .     Left Eye  Reliability of the test: Fair   . Findings: Visual fields normal   . Interpretation: Normal   . Plan: Monitor   . Interval: Same   .          OCT Optic Nerve RNFL Spectralis OU (both eyes)          Performed by: YOLANDE   .     Right Eye  Reliability of the test: Good   . Test Findings: Normal   . Average Thickness Value: 107   . Plan: Monitor   . Interval: Initial   .     Left Eye  Reliability of the test: Good   . Test Findings: Normal   . Average Thickness Value: 107   . Plan:  Monitor   . Interval: Initial   .              Patient follows for pituitary adenoma now status post surgery in January 2025.  Since surgery patient has not felt any changes in her vision.  Patient was told that 90% of the pituitary mass was removed during surgery.  I reviewed her most recent MRI and there is no compression of the chiasm.    Follow up with me in 1 year or as needed.              Attending Physician Attestation:  Complete documentation of historical and exam elements from today's encounter can be found in the full encounter summary report (not reduplicated in this progress note).  I personally obtained the chief complaint(s) and history of present illness.  I confirmed and edited as necessary the review of systems, past medical/surgical history, family history, social history, and examination findings as documented by others; and I examined the patient myself.  I personally reviewed the relevant tests, images, and reports as documented above.  I formulated and edited as necessary the assessment and plan and discussed the findings and management plan with the patient and family. I personally reviewed the ophthalmic test(s) associated with this encounter, agree with the interpretation(s) as documented by the resident/fellow, and have edited the corresponding report(s) as necessary.  - Jay Thakur MD   Fellow, Neuro-Ophthalmology

## 2025-05-20 ENCOUNTER — MYC MEDICAL ADVICE (OUTPATIENT)
Dept: NEUROSURGERY | Facility: CLINIC | Age: 35
End: 2025-05-20
Payer: COMMERCIAL

## 2025-05-20 NOTE — TELEPHONE ENCOUNTER
Pt is s/p endoscopic endonasal resection of pituitary mass with stealth with Dr. Ybarra and Dr. Ospina on 1/7/2025.     Had c/o of increased fatigue and HA possibly related to increased activity at work. Still had these at 4/17 s/s but note states mild form. PCO was to RPT MRI in 6 months.     Pt sent in myc stating overall the last few weeks she has had increased fatigue, intermittent HA, feeling worn out. 5/17 had x2 sharp pains to right side of head. 2/3 sharp pains spread between 20-30 min until 10 pm. She then had a busy day on Monday and by today at 8:30 am she got very tired, dizzy, light headed, and overall malaise. She had to sit down due to lightheadedness. PCOT and BP readings have been normal per pt.     Pt reports no changes in vision, continued blurry vision that had preop. At last visit with Dr. Garcia, pt states he had no concerns. HA are 3-4/10. Sharp pains hit 10/10. No falls or injuries. No fevers. Malaise possibility following active days but also states this can be random.     Routed to [facility] team to inquire on recc. Reviewed red flags.

## 2025-05-21 NOTE — TELEPHONE ENCOUNTER
Dr. Ybarra states: Doesn't sound too specific/concerning to me, so probably just observation.  But no problem to repeat MRI if concerning symptoms     Pt updated.

## 2025-06-12 ENCOUNTER — HOSPITAL ENCOUNTER (OUTPATIENT)
Dept: MRI IMAGING | Facility: CLINIC | Age: 35
End: 2025-06-12
Attending: NEUROLOGICAL SURGERY
Payer: COMMERCIAL

## 2025-06-12 DIAGNOSIS — G93.89 SUPRASELLAR MASS: ICD-10-CM

## 2025-06-12 LAB
ANION GAP SERPL CALCULATED.3IONS-SCNC: 10 MMOL/L (ref 7–15)
BUN SERPL-MCNC: 14.9 MG/DL (ref 6–20)
CALCIUM SERPL-MCNC: 9.4 MG/DL (ref 8.8–10.4)
CHLORIDE SERPL-SCNC: 102 MMOL/L (ref 98–107)
CREAT SERPL-MCNC: 0.71 MG/DL (ref 0.51–0.95)
EGFRCR SERPLBLD CKD-EPI 2021: >90 ML/MIN/1.73M2
GLUCOSE SERPL-MCNC: 131 MG/DL (ref 70–99)
HCO3 SERPL-SCNC: 27 MMOL/L (ref 22–29)
POTASSIUM SERPL-SCNC: 3.8 MMOL/L (ref 3.4–5.3)
SODIUM SERPL-SCNC: 139 MMOL/L (ref 135–145)
T4 FREE SERPL-MCNC: 1.19 NG/DL (ref 0.9–1.7)
TSH SERPL DL<=0.005 MIU/L-ACNC: 4.02 UIU/ML (ref 0.3–4.2)

## 2025-06-12 PROCEDURE — 82397 CHEMILUMINESCENT ASSAY: CPT | Performed by: NEUROLOGICAL SURGERY

## 2025-06-12 PROCEDURE — 255N000002 HC RX 255 OP 636: Performed by: NEUROLOGICAL SURGERY

## 2025-06-12 PROCEDURE — A9585 GADOBUTROL INJECTION: HCPCS | Performed by: NEUROLOGICAL SURGERY

## 2025-06-12 PROCEDURE — 84443 ASSAY THYROID STIM HORMONE: CPT | Performed by: NEUROLOGICAL SURGERY

## 2025-06-12 PROCEDURE — 82435 ASSAY OF BLOOD CHLORIDE: CPT | Performed by: NEUROLOGICAL SURGERY

## 2025-06-12 PROCEDURE — 36415 COLL VENOUS BLD VENIPUNCTURE: CPT | Performed by: NEUROLOGICAL SURGERY

## 2025-06-12 PROCEDURE — 70553 MRI BRAIN STEM W/O & W/DYE: CPT

## 2025-06-12 PROCEDURE — 84439 ASSAY OF FREE THYROXINE: CPT | Performed by: NEUROLOGICAL SURGERY

## 2025-06-12 RX ORDER — GADOBUTROL 604.72 MG/ML
9.5 INJECTION INTRAVENOUS ONCE
Status: COMPLETED | OUTPATIENT
Start: 2025-06-12 | End: 2025-06-12

## 2025-06-12 RX ADMIN — GADOBUTROL 9.5 ML: 604.72 INJECTION INTRAVENOUS at 18:03

## 2025-06-17 ENCOUNTER — TELEPHONE (OUTPATIENT)
Dept: NEUROSURGERY | Facility: CLINIC | Age: 35
End: 2025-06-17
Payer: COMMERCIAL

## 2025-06-17 LAB — TESTOST SERPL-MCNC: 5 NG/DL (ref 8–60)

## 2025-06-17 NOTE — TELEPHONE ENCOUNTER
Dr. Ybarra requesting patient be scheduled for a phone encounter on Thursday or Friday between cases to review MRI brain w/o and w contrast.    Encounter routed to scheduling to assist.

## 2025-06-17 NOTE — TELEPHONE ENCOUNTER
Left Voicemail (1st Attempt) for the patient to call back and schedule the following:    Location:  Neurosurgery  Provider: Dr. Ybarra  Appointment type: Return Adult Neurosurgery  Appointment mode Virtual- Telephone  Return date: either June 18 or 19    Specialty phone number: [phone]    Is Imaging Needed: N  Imaging Phone Number to provide to patient: NA    Additional Notes: Provider wants to have a telephone/virtual visit with patient either Thursday or Friday of this week. Please Warm transfer the call as patient will be worked in between patients.

## 2025-08-09 ENCOUNTER — HEALTH MAINTENANCE LETTER (OUTPATIENT)
Age: 35
End: 2025-08-09

## 2025-08-13 ENCOUNTER — OFFICE VISIT (OUTPATIENT)
Dept: OTOLARYNGOLOGY | Facility: CLINIC | Age: 35
End: 2025-08-13
Payer: COMMERCIAL

## 2025-08-13 VITALS
SYSTOLIC BLOOD PRESSURE: 117 MMHG | WEIGHT: 204.3 LBS | DIASTOLIC BLOOD PRESSURE: 79 MMHG | HEIGHT: 60 IN | OXYGEN SATURATION: 98 % | BODY MASS INDEX: 40.11 KG/M2 | HEART RATE: 85 BPM

## 2025-08-13 DIAGNOSIS — J31.0 CHRONIC RHINITIS: Primary | ICD-10-CM

## 2025-08-13 RX ORDER — LORATADINE 10 MG/1
10 TABLET ORAL DAILY
COMMUNITY

## 2025-08-13 ASSESSMENT — PAIN SCALES - GENERAL: PAINLEVEL_OUTOF10: MILD PAIN (2)

## (undated) DEVICE — SPONGE COTTONOID 1/2X3" 80-1407

## (undated) DEVICE — RX SURGIFLO HEMOSTATIC MATRIX W/THROMBIN 8ML 2994

## (undated) DEVICE — ESU PENCIL W/HOLSTER E2350H

## (undated) DEVICE — SPONGE SURGIFOAM 50

## (undated) DEVICE — SYR EAR 3OZ BULB IRR STRL DISP BLU PVC 4173

## (undated) DEVICE — TUBING SUCTION MEDI-VAC SOFT 3/16"X20' N520A

## (undated) DEVICE — NEEDLE HYPO MONOJECT STANDARD 22GA 1 1/2IN BLUE 1188822112

## (undated) DEVICE — GUIDE MED SPIWAY SRG ENSL

## (undated) DEVICE — PREP CHLORAPREP 26ML TINTED HI-LITE ORANGE 930815

## (undated) DEVICE — ESU GROUND PAD UNIVERSAL W/O CORD

## (undated) DEVICE — LINEN TOWEL PACK X5 5464

## (undated) DEVICE — DRAPE POUCH INSTRUMENT 1018

## (undated) DEVICE — ENDO SHEATH STORZ SHARPSITE ENDOSCRUB 0DEG 4MM 1912000

## (undated) DEVICE — DRAPE ISOLATION BAG 1003

## (undated) DEVICE — WIPES FOLEY CARE SURESTEP PROVON DFC100

## (undated) DEVICE — ENDO INSERT BLADE KNIFE MICRO STORZ SICKLE 28164MS/3

## (undated) DEVICE — Device

## (undated) DEVICE — DRSG NASOPORE FRAG FIRM 8CM 5400-020-008

## (undated) DEVICE — BAG DECANTER STERILE WHITE DYNJDEC09

## (undated) DEVICE — PACK NASAL SINUS SEN15NSFSF

## (undated) DEVICE — DRAPE WARMER 66X44" ORS-300

## (undated) DEVICE — PROBE DOPPLER DISP 07-150-10

## (undated) DEVICE — DRAPE IOBAN INCISE 23X17" 6650EZ

## (undated) DEVICE — STPL SKIN 35W ROTATING HEAD PRW35

## (undated) DEVICE — SOL NACL 0.9% INJ 250ML BAG 2B1322Q

## (undated) DEVICE — SOL WATER IRRIG 1000ML BOTTLE 2F7114

## (undated) DEVICE — MARKER SPHERES PASSIVE MEDT PACK 5 8801075

## (undated) DEVICE — SOL NACL 0.9% INJ 1000ML BAG 2B1324X

## (undated) DEVICE — SOL NACL 0.9% IRRIG 1000ML BOTTLE 07138-09

## (undated) DEVICE — VIAL DECANTER STERILE WHITE DYNJDEC06

## (undated) DEVICE — SURGICEL HEMOSTAT 2X14" 1951

## (undated) DEVICE — NDL SPINAL 22GA 3.5" QUINCKE 405181

## (undated) DEVICE — SUCTION MANIFOLD NEPTUNE 2 SYS 4 PORT 0702-020-000

## (undated) DEVICE — GOWN XLG DISP 9545

## (undated) DEVICE — PIN SKULL MAYFIELD ADULT TITANIUM 3/PK A1120

## (undated) DEVICE — ANTIFOG SOLUTION W/FOAM PAD 31142527

## (undated) DEVICE — GLOVE BIOGEL PI MICRO SZ 7.5 48575

## (undated) RX ORDER — HYDROMORPHONE HCL IN WATER/PF 6 MG/30 ML
PATIENT CONTROLLED ANALGESIA SYRINGE INTRAVENOUS
Status: DISPENSED
Start: 2025-01-07

## (undated) RX ORDER — DEXAMETHASONE SODIUM PHOSPHATE 4 MG/ML
INJECTION, SOLUTION INTRA-ARTICULAR; INTRALESIONAL; INTRAMUSCULAR; INTRAVENOUS; SOFT TISSUE
Status: DISPENSED
Start: 2025-01-07

## (undated) RX ORDER — PROPOFOL 10 MG/ML
INJECTION, EMULSION INTRAVENOUS
Status: DISPENSED
Start: 2025-01-07

## (undated) RX ORDER — HYDRALAZINE HYDROCHLORIDE 20 MG/ML
INJECTION INTRAMUSCULAR; INTRAVENOUS
Status: DISPENSED
Start: 2025-01-07

## (undated) RX ORDER — ONDANSETRON 2 MG/ML
INJECTION INTRAMUSCULAR; INTRAVENOUS
Status: DISPENSED
Start: 2025-01-07

## (undated) RX ORDER — OXYMETAZOLINE HYDROCHLORIDE 0.05 G/100ML
SPRAY NASAL
Status: DISPENSED
Start: 2025-01-07

## (undated) RX ORDER — HYDROMORPHONE HYDROCHLORIDE 1 MG/ML
INJECTION, SOLUTION INTRAMUSCULAR; INTRAVENOUS; SUBCUTANEOUS
Status: DISPENSED
Start: 2025-01-07

## (undated) RX ORDER — DEXMEDETOMIDINE HYDROCHLORIDE 4 UG/ML
INJECTION, SOLUTION INTRAVENOUS
Status: DISPENSED
Start: 2025-01-07

## (undated) RX ORDER — REMIFENTANIL HYDROCHLORIDE 1 MG/ML
INJECTION, POWDER, LYOPHILIZED, FOR SOLUTION INTRAVENOUS
Status: DISPENSED
Start: 2025-01-07

## (undated) RX ORDER — FENTANYL CITRATE 50 UG/ML
INJECTION, SOLUTION INTRAMUSCULAR; INTRAVENOUS
Status: DISPENSED
Start: 2025-01-07

## (undated) RX ORDER — GINSENG 100 MG
CAPSULE ORAL
Status: DISPENSED
Start: 2025-01-07

## (undated) RX ORDER — ACETAMINOPHEN 500 MG
TABLET ORAL
Status: DISPENSED
Start: 2025-01-07

## (undated) RX ORDER — LABETALOL HYDROCHLORIDE 5 MG/ML
INJECTION, SOLUTION INTRAVENOUS
Status: DISPENSED
Start: 2025-01-07

## (undated) RX ORDER — FENTANYL CITRATE 0.05 MG/ML
INJECTION, SOLUTION INTRAMUSCULAR; INTRAVENOUS
Status: DISPENSED
Start: 2025-01-07

## (undated) RX ORDER — EPINEPHRINE 1 MG/ML
INJECTION, SOLUTION INTRAMUSCULAR; SUBCUTANEOUS
Status: DISPENSED
Start: 2025-01-07